# Patient Record
Sex: FEMALE | Race: BLACK OR AFRICAN AMERICAN | Employment: FULL TIME | URBAN - METROPOLITAN AREA
[De-identification: names, ages, dates, MRNs, and addresses within clinical notes are randomized per-mention and may not be internally consistent; named-entity substitution may affect disease eponyms.]

---

## 2017-01-17 ENCOUNTER — OFFICE VISIT (OUTPATIENT)
Dept: INTERNAL MEDICINE CLINIC | Age: 57
End: 2017-01-17

## 2017-01-17 VITALS
DIASTOLIC BLOOD PRESSURE: 84 MMHG | WEIGHT: 205.8 LBS | TEMPERATURE: 98.2 F | OXYGEN SATURATION: 96 % | SYSTOLIC BLOOD PRESSURE: 134 MMHG | HEART RATE: 56 BPM | BODY MASS INDEX: 37.87 KG/M2 | RESPIRATION RATE: 16 BRPM | HEIGHT: 62 IN

## 2017-01-17 DIAGNOSIS — R73.09 ELEVATED HEMOGLOBIN A1C: ICD-10-CM

## 2017-01-17 DIAGNOSIS — L29.9 ITCHING: ICD-10-CM

## 2017-01-17 DIAGNOSIS — L30.9 DERMATITIS: ICD-10-CM

## 2017-01-17 DIAGNOSIS — M17.0 OSTEOARTHRITIS OF BOTH KNEES, UNSPECIFIED OSTEOARTHRITIS TYPE: ICD-10-CM

## 2017-01-17 DIAGNOSIS — E78.00 HYPERCHOLESTEROLEMIA: ICD-10-CM

## 2017-01-17 DIAGNOSIS — E55.9 HYPOVITAMINOSIS D: ICD-10-CM

## 2017-01-17 DIAGNOSIS — N18.1 CKD (CHRONIC KIDNEY DISEASE), STAGE 1: ICD-10-CM

## 2017-01-17 DIAGNOSIS — I10 ESSENTIAL HYPERTENSION: Primary | ICD-10-CM

## 2017-01-17 RX ORDER — CETIRIZINE HCL 10 MG
10 TABLET ORAL DAILY
Qty: 90 TAB | Refills: 1 | Status: SHIPPED | OUTPATIENT
Start: 2017-01-17 | End: 2021-06-09

## 2017-01-17 RX ORDER — LOSARTAN POTASSIUM 100 MG/1
TABLET ORAL
Qty: 90 TAB | Refills: 1 | Status: SHIPPED | OUTPATIENT
Start: 2017-01-17 | End: 2017-07-23 | Stop reason: SDUPTHER

## 2017-01-17 RX ORDER — AMLODIPINE BESYLATE 5 MG/1
TABLET ORAL
Qty: 90 TAB | Refills: 1 | Status: SHIPPED | OUTPATIENT
Start: 2017-01-17 | End: 2017-07-23 | Stop reason: SDUPTHER

## 2017-01-17 RX ORDER — HYDROCORTISONE 1 %
CREAM (GRAM) TOPICAL 2 TIMES DAILY
Qty: 30 G | Refills: 1 | Status: SHIPPED | OUTPATIENT
Start: 2017-01-17 | End: 2017-01-24

## 2017-01-17 RX ORDER — SULINDAC 200 MG/1
200 TABLET ORAL
Qty: 50 TAB | Refills: 1 | Status: SHIPPED | OUTPATIENT
Start: 2017-01-17 | End: 2018-01-08 | Stop reason: SDUPTHER

## 2017-01-17 RX ORDER — ATENOLOL 100 MG/1
TABLET ORAL
Qty: 90 TAB | Refills: 1 | Status: SHIPPED | OUTPATIENT
Start: 2017-01-17 | End: 2017-05-23 | Stop reason: SDUPTHER

## 2017-01-17 RX ORDER — HYDROCHLOROTHIAZIDE 25 MG/1
TABLET ORAL
Qty: 90 TAB | Refills: 1 | Status: SHIPPED | OUTPATIENT
Start: 2017-01-17 | End: 2017-07-23 | Stop reason: SDUPTHER

## 2017-01-17 RX ORDER — SIMVASTATIN 20 MG/1
TABLET, FILM COATED ORAL
Qty: 90 TAB | Refills: 1 | Status: SHIPPED | OUTPATIENT
Start: 2017-01-17 | End: 2017-07-23 | Stop reason: SDUPTHER

## 2017-01-17 NOTE — PROGRESS NOTES
Rm 18    Chief Complaint   Patient presents with    Medication Refill     Patient would like her refill to go to [facility]    Health Maintenance Due   Topic Date Due    Hepatitis C Screening  1960    BREAST CANCER SCRN MAMMOGRAM  04/17/2014    PAP AKA CERVICAL CYTOLOGY  05/30/2016    INFLUENZA AGE 9 TO ADULT  08/01/2016     1. Have you been to the ER, urgent care clinic since your last visit? Hospitalized since your last visit? No    2. Have you seen or consulted any other health care providers outside of the [address] [place] [address] since your last visit? Include any pap smears or colon screening.  No    Learning Assessment 3/30/2015   PRIMARY LEARNER Patient   HIGHEST LEVEL OF EDUCATION - PRIMARY LEARNER  > 4 YEARS OF COLLEGE   BARRIERS PRIMARY LEARNER NONE   CO-LEARNER CAREGIVER No   PRIMARY LANGUAGE ENGLISH   LEARNER PREFERENCE PRIMARY READING   ANSWERED BY Patient   RELATIONSHIP SELF     PHQ 2 / 9, over the last two weeks 8/25/2015   Little interest or pleasure in doing things Not at all   Feeling down, depressed or hopeless Several days   Total Score PHQ 2 1     Living medical will information given at previous visit

## 2017-01-17 NOTE — MR AVS SNAPSHOT
Visit Information Date & Time Provider Department Dept. Phone Encounter #  
 1/17/2017  8:30 AM Rehana Valle, [address] [place] [address] and Internal Medicine [phone] [id number] Follow-up Instructions Return in about 4 months (around 5/17/2017) for Blood Pressure follow-up, lab review. Upcoming Health Maintenance Date Due Hepatitis C Screening 1960 BREAST CANCER SCRN MAMMOGRAM 4/17/2014 PAP AKA CERVICAL CYTOLOGY 5/30/2016 INFLUENZA AGE 9 TO ADULT 8/1/2016 COLONOSCOPY 8/24/2020 DTaP/Tdap/Td series (2 - Td) 10/4/2022 Allergies as of 1/17/2017  Review Complete On: 1/17/2017 By: Rehana Valle MD  
  
 Severity Noted Reaction Type Reactions Ace Inhibitors  10/04/2012   Intolerance Cough Benazepril--stopped when off med. Hydroxyzine  01/11/2016    Rash Notes rash with regular use about one month after starting for itching/rash. No problems with Benadryl noted. Zyrtec [Cetirizine]  01/11/2016    Itching  
 rash Current Immunizations  Reviewed on 3/30/2015 Name Date Influenza Vaccine (Quad) PF 2/3/2014 Influenza Vaccine Split 10/4/2012 TDAP Vaccine 10/4/2012 Not reviewed this visit You Were Diagnosed With   
  
 Codes Comments Essential hypertension    -  Primary ICD-10-CM: I10 
ICD-9-CM: 401.9 Osteoarthritis of both knees, unspecified osteoarthritis type     ICD-10-CM: M17.0 ICD-9-CM: 715.96 Itching     ICD-10-CM: L29.9 ICD-9-CM: 698.9 CKD (chronic kidney disease), stage 1     ICD-10-CM: N18.1 ICD-9-CM: 585.1 Hypercholesterolemia     ICD-10-CM: E78.00 ICD-9-CM: 272.0 Elevated hemoglobin A1c     ICD-10-CM: R73.09 
ICD-9-CM: 790.29 Dermatitis     ICD-10-CM: L30.9 ICD-9-CM: 692.9 Hypovitaminosis D     ICD-10-CM: E55.9 ICD-9-CM: 268.9 Vitals BP Pulse Temp Resp Height(growth percentile) Weight(growth percentile) 134/84 (BP 1 Location: Left arm, BP Patient Position: Sitting) (!) 56 98.2 °F (36.8 °C) (Oral) 16 5' 2\" (1.575 m) 205 lb 12.8 oz (93.4 kg) SpO2 BMI OB Status Smoking Status 96% 37.64 kg/m2 Ablation Never Smoker Vitals History BMI and BSA Data Body Mass Index Body Surface Area  
 37.64 kg/m 2 2.02 m 2 Preferred Pharmacy Pharmacy Name Phone [facility] [phone] Your Updated Medication List  
  
   
This list is accurate as of: 1/17/17 10:05 AM.  Always use your most recent med list. amLODIPine 5 mg tablet Commonly known as:  [place] TAKE ONE TABLET BY MOUTH ONCE DAILY **TAKE  WITH  OTHER  BLOOD  PRESSURE  MEDICINES  NIGHTLY**  
  
 atenolol 100 mg tablet Commonly known as:  TENORMIN  
TAKE ONE TABLET BY MOUTH ONCE DAILY  
  
 cetirizine 10 mg tablet Commonly known as:  ZYRTEC Take 1 Tab by mouth daily. hydroCHLOROthiazide 25 mg tablet Commonly known as:  HYDRODIURIL  
TAKE ONE TABLET BY MOUTH ONCE DAILY  
  
 hydrocortisone 1 % topical cream  
Commonly known as:  CORTAID Apply  to affected area two (2) times a day for 7 days. use thin layer on rash as directed. losartan 100 mg tablet Commonly known as:  COZAAR  
TAKE ONE TABLET BY MOUTH ONCE DAILY  
  
 simvastatin 20 mg tablet Commonly known as:  ZOCOR  
TAKE ONE TABLET BY MOUTH ONCE DAILY AT  NIGHT  
  
 sulindac 200 mg tablet Commonly known as:  CLINORIL Take 1 Tab by mouth two (2) times daily as needed for Pain. Take instead of BC powder or other NSAIDs (ibuprofen, naprosyn). Prescriptions Sent to Pharmacy Refills  
 sulindac (CLINORIL) 200 mg tablet 1 Sig: Take 1 Tab by mouth two (2) times daily as needed for Pain. Take instead of BC powder or other NSAIDs (ibuprofen, naprosyn).   
 Class: Normal  
 Pharmacy: [address], [address] [facility] Ph #: [phone] Route: Oral  
 cetirizine (ZYRTEC) 10 mg tablet 1 Sig: Take 1 Tab by mouth daily. Class: Normal  
 Pharmacy: [address], [address] Ph #: [phone] Route: Oral  
 atenolol (TENORMIN) 100 mg tablet 1 Sig: TAKE ONE TABLET BY MOUTH ONCE DAILY Class: Normal  
 Pharmacy: [address], [address] Ph #: [phone]  
 simvastatin (ZOCOR) 20 mg tablet 1 Sig: TAKE ONE TABLET BY MOUTH ONCE DAILY AT  NIGHT Class: Normal  
 Pharmacy: [address], [address] Ph #: [phone]  
 losartan (COZAAR) 100 mg tablet 1 Sig: TAKE ONE TABLET BY MOUTH ONCE DAILY Class: Normal  
 Pharmacy: [address], [address] Ph #: [phone]  
 hydroCHLOROthiazide (HYDRODIURIL) 25 mg tablet 1 Sig: TAKE ONE TABLET BY MOUTH ONCE DAILY Class: Normal  
 Pharmacy: [address], [address] Ph #: [phone]  
 amLODIPine (NORVASC) 5 mg tablet 1 Sig: TAKE ONE TABLET BY MOUTH ONCE DAILY **TAKE  WITH  OTHER  BLOOD  PRESSURE  MEDICINES  NIGHTLY**  
 Class: Normal  
 Pharmacy: [address], [place] - [address] Ph #: [phone]  
 hydrocortisone (CORTAID) 1 % topical cream 1 Sig: Apply  to affected area two (2) times a day for 7 days. use thin layer on rash as directed. Class: Normal  
 Pharmacy: [address], [address] Ph #: [phone] Route: Topical  
  
We Performed the Following CBC WITH AUTOMATED DIFF [37778 CPT(R)] CK [id number] CPT(R)] HEMOGLOBIN A1C WITH EAG [18232 CPT(R)] HEPATIC FUNCTION PANEL [19321 CPT(R)] LIPID PANEL [60606 CPT(R)] REFERRAL TO DERMATOLOGY [REF19 Custom] Comments:  
 Please evaluate patient for recurring rash. RENAL FUNCTION PANEL [28917 CPT(R)] VITAMIN D, 25 HYDROXY [id number] CPT(R)] Follow-up Instructions Return in about 4 months (around 5/17/2017) for Blood Pressure follow-up, lab review. Referral Information Referral ID Referred By Referred To  
  
 [id number] MD Jasmin ForbesLisa Batista [address] [facility] [address] [address] [address] Phone: [phone] Fax: [phone] Visits Status Start Date End Date 1 New Request 1/17/17 1/17/18 If your referral has a status of pending review or denied, additional information will be sent to support the outcome of this decision. Patient Instructions You can call either call [address] [place] [address] Dermatology or [facility] if the referred provider/group does not take your insurance. Referrals processing Please verify with your insurance IF you need referral authorization submitted. For insurance plans which require this, please follow the following steps. FAILURE TO DO SO MAY RESULT IN INABILITY TO SEE THE SPECIALIST YOU HAVE BEEN REFERRED TO.  
1. Call and schedule appointment with specialist 
2. Call our clinic and leave message with provider name, and date of appointment 3. We will then submit the referral to your insurance. This process takes 2-5 business days. Use over the counter, topical calamine or caladryl or topical benadryl for the itching/rash. Use colloidal oatmeal baths and/or moisturizers to help with itching. Use Benadryl/diphenhydramine as directed/needed for itching. Introducing [facility] & HEALTH SERVICES! Dear Jana Maguire: Thank you for requesting a Inbox account. Our records indicate that you already have an active Inbox account. You can access your account anytime at [email or web address] Did you know that you can access your hospital and ER discharge instructions at any time in [employer]Roadie? You can also review all of your test results from your hospital stay or ER visit. Additional Information If you have questions, please visit the Frequently Asked Questions section of the [employer] website at [email or web address] Remember, Sound[employer]ie is NOT to be used for urgent needs. For medical emergencies, dial 911. Now available from your iPhone and Android! Please provide this summary of care documentation to your next provider. Your primary care clinician is listed as [facility]. If you have any questions after today's visit, please call [phone].

## 2017-01-17 NOTE — PROGRESS NOTES
HISTORY OF PRESENT ILLNESS  Chante Castro is a 61 y.o. female. HPI  Here for follow-up, medication review--last seen Jan 2016. Notes not able to come because work too busy. She notes still getting rash at times. Not using Zyrtec at this time, but helped in past.  She does not have Zyrtec at this time. Had prescribed in past.    Reviewed meds, refills, prior labs. She is interested in seeing Derm for rash. Reviewed referral with pt. Reviewed otpical steroid PRN. Fasting for labs today. ROS      Blood pressure 134/84, pulse (!) 56, temperature 98.2 °F (36.8 °C), temperature source Oral, resp. rate 16, height 5' 2\" (1.575 m), weight 205 lb 12.8 oz (93.4 kg), SpO2 96 %. Vitals:    01/17/17 0854   BP: 134/84   Pulse: (!) 56   Resp: 16   Temp: 98.2 °F (36.8 °C)   TempSrc: Oral   SpO2: 96%   Weight: 205 lb 12.8 oz (93.4 kg)   Height: 5' 2\" (1.575 m)       Physical Exam   Constitutional: She appears well-developed and well-nourished. No distress. HENT:   Head: Normocephalic and atraumatic. Eyes: Conjunctivae are normal. Right eye exhibits no discharge. Left eye exhibits no discharge. No scleral icterus. Neck: Normal range of motion. Neck supple. No tracheal deviation present. No thyromegaly present. Cardiovascular: Normal rate, regular rhythm, normal heart sounds and intact distal pulses. Exam reveals no gallop and no friction rub. No murmur heard. Pulmonary/Chest: Effort normal and breath sounds normal. No stridor. No respiratory distress. She has no wheezes. She has no rales. She exhibits no tenderness. Abdominal: Soft. Bowel sounds are normal. She exhibits no distension. There is no tenderness. Musculoskeletal: She exhibits no edema or tenderness. Arms:  Lymphadenopathy:     She has no cervical adenopathy. Neurological: She is alert. She exhibits normal muscle tone. Coordination normal.   Skin: Skin is warm. Rash noted. She is not diaphoretic. No erythema. No pallor. Psychiatric: She has a normal mood and affect. Her behavior is normal. Judgment and thought content normal.       ASSESSMENT and PLAN    ICD-10-CM ICD-9-CM    1. Essential hypertension I10 401.9 atenolol (TENORMIN) 100 mg tablet      losartan (COZAAR) 100 mg tablet      hydroCHLOROthiazide (HYDRODIURIL) 25 mg tablet      amLODIPine (NORVASC) 5 mg tablet      CBC WITH AUTOMATED DIFF      RENAL FUNCTION PANEL   2. Osteoarthritis of both knees, unspecified osteoarthritis type M17.0 715.96 sulindac (CLINORIL) 200 mg tablet   3. Itching--resolving contact dermatitis--unclear source L29.9 698.9 cetirizine (ZYRTEC) 10 mg tablet      REFERRAL TO DERMATOLOGY      hydrocortisone (CORTAID) 1 % topical cream   4. CKD (chronic kidney disease), stage 1 N18.1 585.1 RENAL FUNCTION PANEL   5. Hypercholesterolemia E78.00 272.0 simvastatin (ZOCOR) 20 mg tablet      LIPID PANEL      CK      HEPATIC FUNCTION PANEL   6. Elevated hemoglobin A1c R73.09 790.29 HEMOGLOBIN A1C WITH EAG   7. Dermatitis L30.9 692.9 REFERRAL TO DERMATOLOGY      hydrocortisone (CORTAID) 1 % topical cream   8. Hypovitaminosis D E55.9 268.9 VITAMIN D, 25 HYDROXY       1. Good control--med refills reviewed. 2.  Refill reviewed. Uses PRN. 50 tabs with 1 refill expected to last pt for 6mo.    3,7:  Meds and referral reviewed. 4.  Improved last labs--repeat today as renal panel. 5.  Reviewed change statin--review at follow-up after repeat lipids. 6.  Improved from 6.2 to 6 last check. 8.  Normal prior at just >30. Follow-up Disposition:  Return in about 4 months (around 5/17/2017) for Blood Pressure follow-up, lab review. lab results and schedule of future lab studies reviewed with patient  reviewed medications and side effects in detail    For additional documentation of information and/or recommendations discussed this visit, please see notes in instructions.

## 2017-01-17 NOTE — PATIENT INSTRUCTIONS
You can call either call [address] [place] [address] Dermatology or [facility] if the referred provider/group does not take your insurance. Referrals processing  Please verify with your insurance IF you need referral authorization submitted. For insurance plans which require this, please follow the following steps. FAILURE TO DO SO MAY RESULT IN INABILITY TO SEE THE SPECIALIST YOU HAVE BEEN REFERRED TO.   1. Call and schedule appointment with specialist  2. Call our clinic and leave message with provider name, and date of appointment  3. We will then submit the referral to your insurance. This process takes 2-5 business days. Use over the counter, topical calamine or caladryl or topical benadryl for the itching/rash. Use colloidal oatmeal baths and/or moisturizers to help with itching. Use Benadryl/diphenhydramine as directed/needed for itching.

## 2017-01-18 LAB
25(OH)D3+25(OH)D2 SERPL-MCNC: 26.9 NG/ML (ref 30–100)
ALBUMIN SERPL-MCNC: 4.7 G/DL (ref 3.5–5.5)
ALP SERPL-CCNC: 87 IU/L (ref 39–117)
ALT SERPL-CCNC: 14 IU/L (ref 0–32)
AST SERPL-CCNC: 18 IU/L (ref 0–40)
BASOPHILS # BLD AUTO: 0 X10E3/UL (ref 0–0.2)
BASOPHILS NFR BLD AUTO: 0 %
BILIRUB DIRECT SERPL-MCNC: 0.12 MG/DL (ref 0–0.4)
BILIRUB SERPL-MCNC: 0.6 MG/DL (ref 0–1.2)
BUN SERPL-MCNC: 16 MG/DL (ref 6–24)
BUN/CREAT SERPL: 13 (ref 9–23)
CALCIUM SERPL-MCNC: 10.1 MG/DL (ref 8.7–10.2)
CHLORIDE SERPL-SCNC: 101 MMOL/L (ref 96–106)
CHOLEST SERPL-MCNC: 229 MG/DL (ref 100–199)
CK SERPL-CCNC: 164 U/L (ref 24–173)
CO2 SERPL-SCNC: 20 MMOL/L (ref 18–29)
CREAT SERPL-MCNC: 1.21 MG/DL (ref 0.57–1)
EOSINOPHIL # BLD AUTO: 0.2 X10E3/UL (ref 0–0.4)
EOSINOPHIL NFR BLD AUTO: 3 %
ERYTHROCYTE [DISTWIDTH] IN BLOOD BY AUTOMATED COUNT: 13 % (ref 12.3–15.4)
EST. AVERAGE GLUCOSE BLD GHB EST-MCNC: 108 MG/DL
GLUCOSE SERPL-MCNC: 91 MG/DL (ref 65–99)
HBA1C MFR BLD: 5.4 % (ref 4.8–5.6)
HCT VFR BLD AUTO: 41.2 % (ref 34–46.6)
HDLC SERPL-MCNC: 59 MG/DL
HGB BLD-MCNC: 14.1 G/DL (ref 11.1–15.9)
IMM GRANULOCYTES # BLD: 0 X10E3/UL (ref 0–0.1)
IMM GRANULOCYTES NFR BLD: 0 %
LDLC SERPL CALC-MCNC: 137 MG/DL (ref 0–99)
LYMPHOCYTES # BLD AUTO: 1.6 X10E3/UL (ref 0.7–3.1)
LYMPHOCYTES NFR BLD AUTO: 29 %
MCH RBC QN AUTO: 32.3 PG (ref 26.6–33)
MCHC RBC AUTO-ENTMCNC: 34.2 G/DL (ref 31.5–35.7)
MCV RBC AUTO: 95 FL (ref 79–97)
MONOCYTES # BLD AUTO: 0.3 X10E3/UL (ref 0.1–0.9)
MONOCYTES NFR BLD AUTO: 5 %
NEUTROPHILS # BLD AUTO: 3.5 X10E3/UL (ref 1.4–7)
NEUTROPHILS NFR BLD AUTO: 63 %
PHOSPHATE SERPL-MCNC: 3.5 MG/DL (ref 2.5–4.5)
PLATELET # BLD AUTO: 254 X10E3/UL (ref 150–379)
POTASSIUM SERPL-SCNC: 4.1 MMOL/L (ref 3.5–5.2)
PROT SERPL-MCNC: 8 G/DL (ref 6–8.5)
RBC # BLD AUTO: 4.36 X10E6/UL (ref 3.77–5.28)
SODIUM SERPL-SCNC: 144 MMOL/L (ref 134–144)
TRIGL SERPL-MCNC: 163 MG/DL (ref 0–149)
VLDLC SERPL CALC-MCNC: 33 MG/DL (ref 5–40)
WBC # BLD AUTO: 5.6 X10E3/UL (ref 3.4–10.8)

## 2017-01-23 ENCOUNTER — TELEPHONE (OUTPATIENT)
Dept: INTERNAL MEDICINE CLINIC | Age: 57
End: 2017-01-23

## 2017-01-23 NOTE — TELEPHONE ENCOUNTER
Patient wants to know why was amlodipine added back on her medication list for BP.  Please advise  # [id number]

## 2017-05-01 ENCOUNTER — TELEPHONE (OUTPATIENT)
Dept: INTERNAL MEDICINE CLINIC | Age: 57
End: 2017-05-01

## 2017-05-01 ENCOUNTER — HOSPITAL ENCOUNTER (OUTPATIENT)
Dept: GENERAL RADIOLOGY | Age: 57
Discharge: HOME | End: 2017-05-01
Payer: COMMERCIAL

## 2017-05-01 ENCOUNTER — OFFICE VISIT (OUTPATIENT)
Dept: INTERNAL MEDICINE CLINIC | Age: 57
End: 2017-05-01

## 2017-05-01 VITALS
TEMPERATURE: 97.3 F | OXYGEN SATURATION: 97 % | SYSTOLIC BLOOD PRESSURE: 124 MMHG | BODY MASS INDEX: 37.36 KG/M2 | WEIGHT: 203 LBS | HEART RATE: 55 BPM | DIASTOLIC BLOOD PRESSURE: 80 MMHG | RESPIRATION RATE: 17 BRPM | HEIGHT: 62 IN

## 2017-05-01 DIAGNOSIS — R76.11 POSITIVE TB TEST: ICD-10-CM

## 2017-05-01 DIAGNOSIS — Z02.1 PRE-EMPLOYMENT EXAMINATION: ICD-10-CM

## 2017-05-01 DIAGNOSIS — I10 ESSENTIAL HYPERTENSION: ICD-10-CM

## 2017-05-01 DIAGNOSIS — R76.11 POSITIVE TB TEST: Primary | ICD-10-CM

## 2017-05-01 PROCEDURE — 71020 XR CHEST PA LAT: CPT

## 2017-05-01 NOTE — PROGRESS NOTES
Rm 16    Chief Complaint   Patient presents with    Form Completion    Pre employment form Patient stated they did a TB test [employer] and the numbers came back High , copy provided by patient     Health Maintenance Due   Topic    Hepatitis C Screening     BREAST CANCER SCRN MAMMOGRAM     PAP AKA CERVICAL CYTOLOGY          Learning Assessment 3/30/2015   PRIMARY LEARNER Patient   HIGHEST LEVEL OF EDUCATION - PRIMARY LEARNER  > 4 YEARS OF COLLEGE   BARRIERS PRIMARY LEARNER NONE   CO-LEARNER CAREGIVER No   PRIMARY LANGUAGE ENGLISH   LEARNER PREFERENCE PRIMARY READING   ANSWERED BY Patient   RELATIONSHIP SELF     PHQ 2 / 9, over the last two weeks 8/25/2015   Little interest or pleasure in doing things Not at all   Feeling down, depressed or hopeless Several days   Total Score PHQ 2 1     Living medical will information given at previous visit

## 2017-05-01 NOTE — PROGRESS NOTES
HISTORY OF PRESENT ILLNESS  Jorge L Hill is a 61 y.o. female. HPI  Here for evaluation of form. Pt notes with pre-employment physical, had TB testing with [employer]--elevated/high values--has copy with her at visit. Here for eval and form completion/review. Notes no specific exposures to TB. TB screenin. Family member/contact dx with TB disease: no--notes last screened as child. 2. Family member/close contact with (+) PPD: No   3. Birth/residence (more than one wk) in high-risk country: No  4. Prolonged contact/lived with person with (prior) residence in high-risk country:  No    No prior positive testing reported by pt. She notes work as traveling [employer], but not with homeless shelters, hospitals, medical clinics, or immigrant/refugee populations identified as high risk. Travel only on cruise in [place]. No clear risk identified. Reviewed with pt at visit. She has not had screening here with PPD or blood testing. Reviewed CXR  report and CT  report--no active/old disease noted. Plan for eval reviewed. ROS      Blood pressure 124/80, pulse (!) 55, temperature 97.3 °F (36.3 °C), temperature source Oral, resp. rate 17, height 5' 2\" (1.575 m), weight 203 lb (92.1 kg), SpO2 97 %. Physical Exam   Constitutional: She appears well-developed and well-nourished. No distress. HENT:   Head: Normocephalic and atraumatic. Eyes: Conjunctivae are normal. Right eye exhibits no discharge. Left eye exhibits no discharge. No scleral icterus. Neck: Normal range of motion. Neck supple. Cardiovascular: Normal rate, regular rhythm, normal heart sounds and intact distal pulses. Exam reveals no gallop and no friction rub. No murmur heard. Pulmonary/Chest: Effort normal and breath sounds normal. No respiratory distress. She has no wheezes. She has no rales. She exhibits no tenderness. Abdominal: Soft. Bowel sounds are normal. She exhibits no distension. There is no tenderness. Musculoskeletal: She exhibits edema (1+ distal LE's bilat. ). She exhibits no tenderness. Neurological: She is alert. She exhibits normal muscle tone. Coordination normal.   Skin: Skin is warm. No rash noted. She is not diaphoretic. No erythema. No pallor. Psychiatric: She has a normal mood and affect. Her behavior is normal. Judgment and thought content normal.       ASSESSMENT and PLAN    ICD-10-CM ICD-9-CM    1. Positive TB test R76.11 795.51 XR CHEST PA LAT   2. Pre-employment examination Z02.1 V70.5 XR CHEST PA LAT   3. Essential hypertension I10 401.9        1,2:  Reviewed eval, form completion with pt at visit. Release completed to fax form. Prefers mailed to her once completed. Follow-up Disposition:  Return in about 3 months (around 8/1/2017) for Blood Pressure follow-up.  lab results and schedule of future lab studies reviewed with patient  reviewed medications and side effects in detail  radiology results and schedule of future radiology studies reviewed with patient    For additional documentation of information and/or recommendations discussed this visit, please see notes in instructions. Plan and evaluation (above) reviewed with pt at visit  Patient voiced understanding of plan and provided with time to ask/review questions. After Visit Summary (AVS) provided to pt after visit with additional instructions as needed/reviewed. Addendum 5-1-17:  CXR done and reported. Reviewed films. No active disease. Form completed as above to fax to [employer] and mail to pt. See telephone encounter with result to pt.

## 2017-05-01 NOTE — PATIENT INSTRUCTIONS
Please obtain x-ray today as reviewed. Once report received, will fax to [employer]), and mail copy to you as requested. If they do not receive, call us and will re-fax. You will receive a call regarding result of x-ray once reviewed and form faxed.

## 2017-05-01 NOTE — MR AVS SNAPSHOT
Visit Information Date & Time Provider Department Dept. Phone Encounter #  
 5/1/2017 12:00 PM Fabiana Elliott, [address] [place] [address] and Internal Medicine [phone] [id number] Follow-up Instructions Return in about 3 months (around 8/1/2017) for Blood Pressure follow-up. Upcoming Health Maintenance Date Due Hepatitis C Screening 1960 BREAST CANCER SCRN MAMMOGRAM 4/17/2014 PAP AKA CERVICAL CYTOLOGY 5/30/2016 INFLUENZA AGE 9 TO ADULT 8/1/2017 COLONOSCOPY 8/24/2020 DTaP/Tdap/Td series (2 - Td) 10/4/2022 Allergies as of 5/1/2017  Review Complete On: 5/1/2017 By: Fabiana Elliott MD  
  
 Severity Noted Reaction Type Reactions Ace Inhibitors  10/04/2012   Intolerance Cough Benazepril--stopped when off med. Hydroxyzine  01/11/2016    Rash Notes rash with regular use about one month after starting for itching/rash. No problems with Benadryl noted. Zyrtec [Cetirizine]  01/11/2016    Itching  
 rash Current Immunizations  Reviewed on 3/30/2015 Name Date Influenza Vaccine (Quad) PF 2/3/2014 Influenza Vaccine Split 10/4/2012 TDAP Vaccine 10/4/2012 Not reviewed this visit You Were Diagnosed With   
  
 Codes Comments Positive TB test    -  Primary ICD-10-CM: R76.11 
ICD-9-CM: 795.51 Pre-employment examination     ICD-10-CM: Z02.1 ICD-9-CM: V70.5 Essential hypertension     ICD-10-CM: I10 
ICD-9-CM: 401.9 Vitals BP Pulse Temp Resp Height(growth percentile) Weight(growth percentile) 124/80 (BP 1 Location: Left arm, BP Patient Position: Sitting) (!) 55 97.3 °F (36.3 °C) (Oral) 17 5' 2\" (1.575 m) 203 lb (92.1 kg) SpO2 BMI OB Status Smoking Status 97% 37.13 kg/m2 Ablation Never Smoker Vitals History BMI and BSA Data Body Mass Index Body Surface Area  
 37.13 kg/m 2 2.01 m 2 [phone] Your Updated Medication List  
  
   
This list is accurate as of: 5/1/17  1:21 PM.  Always use your most recent med list. amLODIPine 5 mg tablet Commonly known as:  Beryl Christianson TAKE ONE TABLET BY MOUTH ONCE DAILY **TAKE  WITH  OTHER  BLOOD  PRESSURE  MEDICINES  NIGHTLY**  
  
 atenolol 100 mg tablet Commonly known as:  TENORMIN  
TAKE ONE TABLET BY MOUTH ONCE DAILY  
  
 cetirizine 10 mg tablet Commonly known as:  ZYRTEC Take 1 Tab by mouth daily. hydroCHLOROthiazide 25 mg tablet Commonly known as:  HYDRODIURIL  
TAKE ONE TABLET BY MOUTH ONCE DAILY losartan 100 mg tablet Commonly known as:  COZAAR  
TAKE ONE TABLET BY MOUTH ONCE DAILY  
  
 simvastatin 20 mg tablet Commonly known as:  ZOCOR  
TAKE ONE TABLET BY MOUTH ONCE DAILY AT  NIGHT  
  
 sulindac 200 mg tablet Commonly known as:  CLINORIL Take 1 Tab by mouth two (2) times daily as needed for Pain. Take instead of BC powder or other NSAIDs (ibuprofen, naprosyn). Follow-up Instructions Return in about 3 months (around 8/1/2017) for Blood Pressure follow-up. To-Do List   
 05/01/2017 Imaging:  XR CHEST PA LAT Patient Instructions Please obtain x-ray today as reviewed. Once report received, will fax to [employer], and mail copy to you as requested. If they do not receive, call us and will re-fax. You will receive a call regarding result of x-ray once reviewed and form faxed. Introducing \[facility]\"" & HEALTH SERVICES! Dear Ama Colon: Thank you for requesting a [employer] account. Our records indicate that you already have an active [employer] account. You can access your account anytime at [email or web address] Did you know that you can access your hospital and ER discharge instructions at any time in [employer]?   You can also review all of your test results from your hospital stay or ER visit. Additional Information If you have questions, please visit the Frequently Asked Questions section of the [employer] website at [email or web address] Remember, [employer] is NOT to be used for urgent needs. For medical emergencies, dial 911. Now available from your iPhone and Android! Please provide this summary of care documentation to your next provider. Your primary care clinician is listed as [facility]. If you have any questions after today's visit, please call [phone].

## 2017-05-01 NOTE — TELEPHONE ENCOUNTER
Tried to contact patient no answer machine if patient does call please give information from Dr Tamica Woody in previous message, will send also through [employer], copy is in media , copy faxed to [employer] office & patients copy went out in mail

## 2017-05-01 NOTE — TELEPHONE ENCOUNTER
Please let pt know CXR showed no active disease. This is LTBI or latent infection as reviewed at visit. Form completed to fax to [employer] as reviewed, and mail to pt.

## 2017-05-23 DIAGNOSIS — I10 ESSENTIAL HYPERTENSION: ICD-10-CM

## 2017-05-23 RX ORDER — ATENOLOL 50 MG/1
TABLET ORAL
Qty: 60 TAB | Refills: 5 | Status: SHIPPED | OUTPATIENT
Start: 2017-05-23 | End: 2018-01-08 | Stop reason: SDUPTHER

## 2017-05-23 NOTE — TELEPHONE ENCOUNTER
Received request that patients Atenolol Tabs 100mg is out of stock ,  They have Atenolol 50 mg,  Can they fill the script with this is so please send new script over to pharmacy

## 2017-05-23 NOTE — TELEPHONE ENCOUNTER
Refill request(s) approved--atenolol. Atenolol 50mg requested to replace 100mg tab which is out of stock. Requested Prescriptions     Signed Prescriptions Disp Refills    atenolol (TENORMIN) 50 mg tablet 60 Tab 5     Sig: TAKE TWO TABLET BY MOUTH ONCE DAILY. Take instead of single 100mg tablet (out of stock). Authorizing Provider: Sonny Gardner       Please review change with pt if not already done.

## 2017-07-23 DIAGNOSIS — I10 ESSENTIAL HYPERTENSION: ICD-10-CM

## 2017-07-23 DIAGNOSIS — E78.00 HYPERCHOLESTEROLEMIA: ICD-10-CM

## 2017-07-24 RX ORDER — LOSARTAN POTASSIUM 100 MG/1
TABLET ORAL
Qty: 90 TAB | Refills: 1 | Status: SHIPPED | OUTPATIENT
Start: 2017-07-24 | End: 2018-01-08 | Stop reason: SDUPTHER

## 2017-07-24 RX ORDER — HYDROCHLOROTHIAZIDE 25 MG/1
TABLET ORAL
Qty: 90 TAB | Refills: 1 | Status: SHIPPED | OUTPATIENT
Start: 2017-07-24 | End: 2018-01-08 | Stop reason: SDUPTHER

## 2017-07-24 RX ORDER — AMLODIPINE BESYLATE 5 MG/1
TABLET ORAL
Qty: 90 TAB | Refills: 1 | Status: SHIPPED | OUTPATIENT
Start: 2017-07-24 | End: 2018-01-08

## 2017-07-24 RX ORDER — SIMVASTATIN 20 MG/1
TABLET, FILM COATED ORAL
Qty: 90 TAB | Refills: 1 | Status: SHIPPED | OUTPATIENT
Start: 2017-07-24 | End: 2018-01-08 | Stop reason: SDUPTHER

## 2017-10-15 ENCOUNTER — APPOINTMENT (OUTPATIENT)
Dept: GENERAL RADIOLOGY | Age: 57
End: 2017-10-15
Attending: PHYSICIAN ASSISTANT
Payer: COMMERCIAL

## 2017-10-15 ENCOUNTER — HOSPITAL ENCOUNTER (EMERGENCY)
Age: 57
Discharge: HOME | End: 2017-10-15
Attending: EMERGENCY MEDICINE
Payer: COMMERCIAL

## 2017-10-15 VITALS
OXYGEN SATURATION: 99 % | TEMPERATURE: 98.7 F | HEART RATE: 86 BPM | SYSTOLIC BLOOD PRESSURE: 137 MMHG | HEIGHT: 62 IN | RESPIRATION RATE: 16 BRPM | BODY MASS INDEX: 35.88 KG/M2 | WEIGHT: 195 LBS | DIASTOLIC BLOOD PRESSURE: 88 MMHG

## 2017-10-15 DIAGNOSIS — M10.071 ACUTE IDIOPATHIC GOUT OF RIGHT ANKLE: ICD-10-CM

## 2017-10-15 DIAGNOSIS — M25.571 ACUTE RIGHT ANKLE PAIN: Primary | ICD-10-CM

## 2017-10-15 PROCEDURE — 74011250637 HC RX REV CODE- 250/637: Performed by: PHYSICIAN ASSISTANT

## 2017-10-15 PROCEDURE — 73610 X-RAY EXAM OF ANKLE: CPT

## 2017-10-15 PROCEDURE — 74011250636 HC RX REV CODE- 250/636: Performed by: PHYSICIAN ASSISTANT

## 2017-10-15 PROCEDURE — 96372 THER/PROPH/DIAG INJ SC/IM: CPT

## 2017-10-15 PROCEDURE — 99283 EMERGENCY DEPT VISIT LOW MDM: CPT

## 2017-10-15 RX ORDER — PREDNISONE 5 MG/1
TABLET ORAL
Qty: 21 TAB | Refills: 0 | Status: SHIPPED | OUTPATIENT
Start: 2017-10-15 | End: 2017-10-15

## 2017-10-15 RX ORDER — OXYCODONE AND ACETAMINOPHEN 5; 325 MG/1; MG/1
1 TABLET ORAL
Qty: 12 TAB | Refills: 0 | Status: SHIPPED | OUTPATIENT
Start: 2017-10-15 | End: 2018-01-08

## 2017-10-15 RX ORDER — PREDNISONE 5 MG/1
TABLET ORAL
Qty: 21 TAB | Refills: 0 | Status: SHIPPED | OUTPATIENT
Start: 2017-10-15 | End: 2018-01-08

## 2017-10-15 RX ORDER — OXYCODONE AND ACETAMINOPHEN 5; 325 MG/1; MG/1
1 TABLET ORAL
Status: COMPLETED | OUTPATIENT
Start: 2017-10-15 | End: 2017-10-15

## 2017-10-15 RX ADMIN — OXYCODONE HYDROCHLORIDE AND ACETAMINOPHEN 1 TABLET: 5; 325 TABLET ORAL at 12:28

## 2017-10-15 RX ADMIN — METHYLPREDNISOLONE SODIUM SUCCINATE 125 MG: 125 INJECTION, POWDER, FOR SOLUTION INTRAMUSCULAR; INTRAVENOUS at 12:50

## 2017-10-15 NOTE — DISCHARGE INSTRUCTIONS
Foot Pain: Care Instructions  Your Care Instructions  Foot injuries that cause pain and swelling are fairly common. Almost all sports or home repair projects can cause a misstep that ends up as foot pain. Normal wear and tear, especially as you get older, also can cause foot pain. Most minor foot injuries will heal on their own, and home treatment is usually all you need to do. If you have a severe injury, you may need tests and treatment. Follow-up care is a key part of your treatment and safety. Be sure to make and go to all appointments, and call your doctor if you are having problems. Its also a good idea to know your test results and keep a list of the medicines you take. How can you care for yourself at home? · Take pain medicines exactly as directed. ¨ If the doctor gave you a prescription medicine for pain, take it as prescribed. ¨ If you are not taking a prescription pain medicine, ask your doctor if you can take an over-the-counter medicine. · Rest and protect your foot. Take a break from any activity that may cause pain. · Put ice or a cold pack on your foot for 10 to 20 minutes at a time. Put a thin cloth between the ice and your skin. · Prop up the sore foot on a pillow when you ice it or anytime you sit or lie down during the next 3 days. Try to keep it above the level of your heart. This will help reduce swelling. · Your doctor may recommend that you wrap your foot with an elastic bandage. Keep your foot wrapped for as long as your doctor advises. · If your doctor recommends crutches, use them as directed. · Wear roomy footwear. · As soon as pain and swelling end, begin gentle exercises of your foot. Your doctor can tell you which exercises will help. When should you call for help? Call 911 anytime you think you may need emergency care. For example, call if:  · Your foot turns pale, white, blue, or cold.   Call your doctor now or seek immediate medical care if:  · You cannot move or stand on your foot. · Your foot looks twisted or out of its normal position. · Your foot is not stable when you step down. · You have signs of infection, such as:  ¨ Increased pain, swelling, warmth, or redness. ¨ Red streaks leading from the sore area. ¨ Pus draining from a place on your foot. ¨ A fever. · Your foot is numb or tingly. Watch closely for changes in your health, and be sure to contact your doctor if:  · You do not get better as expected. · You have bruises from an injury that last longer than 2 weeks. Where can you learn more? Go to http://vicenta-betsy.info/. Enter F251 in the search box to learn more about \"Foot Pain: Care Instructions. \"  Current as of: March 21, 2017  Content Version: 11.3  © 2228-7480 [employer]. Care instructions adapted under license by [employer] (which disclaims liability or warranty for this information). If you have questions about a medical condition or this instruction, always ask your healthcare professional. [place] [address] any warranty or liability for your use of this information. Gout: Care Instructions  Your Care Instructions  Gout is a form of arthritis caused by a buildup of uric acid crystals in a joint. It causes sudden attacks of pain, swelling, redness, and stiffness, usually in one joint, especially the big toe. Gout usually comes on without a cause. But it can be brought on by drinking alcohol (especially beer) or eating seafood and red meat. Taking certain medicines, such as diuretics or aspirin, also can bring on an attack of gout. Taking your medicines as prescribed and following up with your doctor regularly can help you avoid gout attacks in the future. Follow-up care is a key part of your treatment and safety. Be sure to make and go to all appointments, and call your doctor if you are having problems.  Its also a good idea to know your test results and keep a list of the medicines you take. How can you care for yourself at home? · If the joint is swollen, put ice or a cold pack on the area for 10 to 20 minutes at a time. Put a thin cloth between the ice and your skin. · Prop up the sore limb on a pillow when you ice it or anytime you sit or lie down during the next 3 days. Try to keep it above the level of your heart. This will help reduce swelling. · Rest sore joints. Avoid activities that put weight or strain on the joints for a few days. Take short rest breaks from your regular activities during the day. · Take your medicines exactly as prescribed. Call your doctor if you think you are having a problem with your medicine. · Take pain medicines exactly as directed. ¨ If the doctor gave you a prescription medicine for pain, take it as prescribed. ¨ If you are not taking a prescription pain medicine, ask your doctor if you can take an over-the-counter medicine. · Eat less seafood and red meat. · Check with your doctor before drinking alcohol. · Losing weight, if you are overweight, may help reduce attacks of gout. But do not go on a [employer]. \" Losing a lot of weight in a short amount of time can cause a gout attack. When should you call for help? Call your doctor now or seek immediate medical care if:  · You have a fever. · The joint is so painful you cannot use it. · You have sudden, unexplained swelling, redness, warmth, or severe pain in one or more joints. Watch closely for changes in your health, and be sure to contact your doctor if:  · You have joint pain. · Your symptoms get worse or are not improving after 2 or 3 days. Where can you learn more? Go to http://vicenta-betsy.info/. Enter G135 in the search box to learn more about \"Gout: Care Instructions. \"  Current as of: October 31, 2016  Content Version: 11.3  © 4293-9194 [employer].  Care instructions adapted under license by [employer] (which disclaims liability or warranty for this information). If you have questions about a medical condition or this instruction, always ask your healthcare professional. [place] [address] any warranty or liability for your use of this information.

## 2017-10-15 NOTE — LETTER
[address] 
[facility] EMERGENCY DEPT 
[address] [place] [address] [id number] 
[phone] Work/School Note Date: 10/15/2017 To Whom It May concern: 
 
Tierney Morales was seen and treated today in the emergency room by the following provider(s): 
Attending Provider: Alexus Melgar MD 
Physician Assistant: GABINO Garcia. Tierney Morales No work 48 hours. Sincerely, GABINO Garcia
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
## 2017-10-15 NOTE — ED NOTES
Pt discharged by GABINO Kim. Patient provided with discharge instructions, Rx, and follow-up care instructions. Pt out of ED ambulatory.

## 2017-10-15 NOTE — ED TRIAGE NOTES
Pt. Presents to ED today for complaints of R ankle pain. Pt. Reports falling off of a ladder 3 years ago and states that it \"flairs up often. \"  Pt. Alert and oriented. Pt. Placed in position of comfort with call bell in reach.

## 2018-01-05 DIAGNOSIS — I10 ESSENTIAL HYPERTENSION: ICD-10-CM

## 2018-01-05 DIAGNOSIS — E78.00 HYPERCHOLESTEROLEMIA: ICD-10-CM

## 2018-01-05 RX ORDER — HYDROCHLOROTHIAZIDE 25 MG/1
TABLET ORAL
Qty: 90 TAB | Refills: 1 | Status: CANCELLED | OUTPATIENT
Start: 2018-01-05

## 2018-01-05 RX ORDER — SIMVASTATIN 20 MG/1
TABLET, FILM COATED ORAL
Qty: 90 TAB | Refills: 1 | Status: CANCELLED | OUTPATIENT
Start: 2018-01-05

## 2018-01-05 RX ORDER — LOSARTAN POTASSIUM 100 MG/1
TABLET ORAL
Qty: 90 TAB | Refills: 1 | Status: CANCELLED | OUTPATIENT
Start: 2018-01-05

## 2018-01-05 RX ORDER — ATENOLOL 50 MG/1
TABLET ORAL
Qty: 60 TAB | Refills: 5 | Status: CANCELLED | OUTPATIENT
Start: 2018-01-05

## 2018-01-05 NOTE — TELEPHONE ENCOUNTER
LOV: May 01, 2017  Patient want prescriptions sent to [facility] located on [address] [place] [address]. Their pharmacy number is # [phone]. Unable to attach pharmacy on file. Pharmacy is not listed.  ( brand new)

## 2018-01-08 ENCOUNTER — OFFICE VISIT (OUTPATIENT)
Dept: INTERNAL MEDICINE CLINIC | Age: 58
End: 2018-01-08

## 2018-01-08 VITALS
BODY MASS INDEX: 36.44 KG/M2 | HEIGHT: 62 IN | WEIGHT: 198 LBS | SYSTOLIC BLOOD PRESSURE: 150 MMHG | TEMPERATURE: 98.3 F | DIASTOLIC BLOOD PRESSURE: 97 MMHG | OXYGEN SATURATION: 97 % | HEART RATE: 70 BPM | RESPIRATION RATE: 16 BRPM

## 2018-01-08 DIAGNOSIS — M17.0 OSTEOARTHRITIS OF BOTH KNEES, UNSPECIFIED OSTEOARTHRITIS TYPE: ICD-10-CM

## 2018-01-08 DIAGNOSIS — E78.00 HYPERCHOLESTEROLEMIA: ICD-10-CM

## 2018-01-08 DIAGNOSIS — R73.09 ELEVATED HEMOGLOBIN A1C: ICD-10-CM

## 2018-01-08 DIAGNOSIS — B35.1 ONYCHOMYCOSIS: Primary | ICD-10-CM

## 2018-01-08 DIAGNOSIS — I10 ESSENTIAL HYPERTENSION: ICD-10-CM

## 2018-01-08 DIAGNOSIS — E55.9 HYPOVITAMINOSIS D: ICD-10-CM

## 2018-01-08 RX ORDER — HYDROCHLOROTHIAZIDE 25 MG/1
TABLET ORAL
Qty: 90 TAB | Refills: 1 | Status: SHIPPED | OUTPATIENT
Start: 2018-01-08 | End: 2018-01-20 | Stop reason: SDUPTHER

## 2018-01-08 RX ORDER — ATENOLOL 100 MG/1
100 TABLET ORAL DAILY
Qty: 90 TAB | Refills: 1 | Status: SHIPPED | OUTPATIENT
Start: 2018-01-08 | End: 2018-01-22 | Stop reason: SDUPTHER

## 2018-01-08 RX ORDER — TERBINAFINE HYDROCHLORIDE 250 MG/1
250 TABLET ORAL DAILY
Qty: 90 TAB | Refills: 0 | Status: SHIPPED | OUTPATIENT
Start: 2018-01-08 | End: 2018-10-16

## 2018-01-08 RX ORDER — LOSARTAN POTASSIUM 100 MG/1
TABLET ORAL
Qty: 90 TAB | Refills: 1 | Status: SHIPPED | OUTPATIENT
Start: 2018-01-08 | End: 2018-01-20 | Stop reason: SDUPTHER

## 2018-01-08 RX ORDER — SULINDAC 200 MG/1
200 TABLET ORAL
Qty: 50 TAB | Refills: 1 | Status: SHIPPED | OUTPATIENT
Start: 2018-01-08 | End: 2018-06-21 | Stop reason: SDUPTHER

## 2018-01-08 RX ORDER — SIMVASTATIN 20 MG/1
TABLET, FILM COATED ORAL
Qty: 90 TAB | Refills: 1 | Status: SHIPPED | OUTPATIENT
Start: 2018-01-08 | End: 2018-01-20 | Stop reason: SDUPTHER

## 2018-01-08 NOTE — PROGRESS NOTES
Rm 17    Chief Complaint   Patient presents with    Medication Evaluation    Blood Pressure Check       Patient is here today for medication evaluation and a b/p check    Per patient her B/p has been high due to out of medication. States she needs refills on medications    Health Maintenance Due   Topic Date Due    Hepatitis C Screening  1960    BREAST CANCER SCRN MAMMOGRAM  04/17/2014    PAP AKA CERVICAL CYTOLOGY  05/30/2016       Patient is due for Hep C  Screening , Pap exam , mammogram    1. Have you been to the ER, urgent care clinic since your last visit? Hospitalized since your last visit? Yes/ acute right ankle pain/ 10/2017- notes are in chart  2. Have you seen or consulted any other health care providers outside of the [address] [place] [address] since your last visit? Include any pap smears or colon screening.  no    Learning Assessment 3/30/2015   PRIMARY LEARNER Patient   HIGHEST LEVEL OF EDUCATION - PRIMARY LEARNER  > 4 YEARS OF COLLEGE   BARRIERS PRIMARY LEARNER NONE   CO-LEARNER CAREGIVER No   PRIMARY LANGUAGE ENGLISH   LEARNER PREFERENCE PRIMARY READING   ANSWERED BY Patient   RELATIONSHIP SELF     PHQ over the last two weeks 1/8/2018   Little interest or pleasure in doing things Not at all   Feeling down, depressed or hopeless Several days   Total Score PHQ 2 1     Living medical will information given at previous visit

## 2018-01-08 NOTE — MR AVS SNAPSHOT
Visit Information Date & Time Provider Department Dept. Phone Encounter #  
 1/8/2018  1:45 PM Ibeth Hall, [address] [place] [address], [facility] and Internal [facility] [phone] Follow-up Instructions Return in about 4 weeks (around 2/5/2018) for Blood Pressure follow-up, non-fasting labs. Upcoming Health Maintenance Date Due Hepatitis C Screening 1960 BREAST CANCER SCRN MAMMOGRAM 4/17/2014 PAP AKA CERVICAL CYTOLOGY 5/30/2016 COLONOSCOPY 8/24/2020 DTaP/Tdap/Td series (2 - Td) 10/4/2022 Allergies as of 1/8/2018  Review Complete On: 1/8/2018 By: Ibeth Hall MD  
  
 Severity Noted Reaction Type Reactions Ace Inhibitors  10/04/2012   Intolerance Cough Benazepril--stopped when off med. Hydroxyzine  01/11/2016    Rash Notes rash with regular use about one month after starting for itching/rash. No problems with Benadryl noted. Zyrtec [Cetirizine]  01/11/2016    Itching  
 rash Current Immunizations  Reviewed on 3/30/2015 Name Date Influenza Vaccine 9/25/2017 Influenza Vaccine (Quad) PF 2/3/2014 Influenza Vaccine Split 10/4/2012 TDAP Vaccine 10/4/2012 Not reviewed this visit You Were Diagnosed With   
  
 Codes Comments Onychomycosis    -  Primary ICD-10-CM: B35.1 ICD-9-CM: 110.1 Essential hypertension     ICD-10-CM: I10 
ICD-9-CM: 401.9 Hypercholesterolemia     ICD-10-CM: E78.00 ICD-9-CM: 272.0 Elevated hemoglobin A1c     ICD-10-CM: R73.09 
ICD-9-CM: 790.29 Hypovitaminosis D     ICD-10-CM: E55.9 ICD-9-CM: 268.9 Osteoarthritis of both knees, unspecified osteoarthritis type     ICD-10-CM: M17.0 ICD-9-CM: 715.96 Vitals BP Pulse Temp Resp Height(growth percentile) Weight(growth percentile) (!) 150/97 (BP 1 Location: Right arm, BP Patient Position: Sitting) 70 98.3 °F (36.8 °C) (Oral) 16 5' 2\" (1.575 m) 198 lb (89.8 kg) SpO2 BMI OB Status Smoking Status 97% 36.21 kg/m2 Ablation Never Smoker Vitals History BMI and BSA Data Body Mass Index Body Surface Area  
 36.21 kg/m 2 1.98 m 2 [phone] Your Updated Medication List  
  
   
This list is accurate as of: 1/8/18  2:44 PM.  Always use your most recent med list.  
  
  
  
  
 atenolol 100 mg tablet Commonly known as:  TENORMIN Take 1 Tab by mouth daily. cetirizine 10 mg tablet Commonly known as:  ZYRTEC Take 1 Tab by mouth daily. hydroCHLOROthiazide 25 mg tablet Commonly known as:  HYDRODIURIL  
TAKE 1 TABLET DAILY losartan 100 mg tablet Commonly known as:  COZAAR  
TAKE 1 TABLET DAILY  
  
 simvastatin 20 mg tablet Commonly known as:  ZOCOR  
TAKE 1 TABLET DAILY AT NIGHT  
  
 sulindac 200 mg tablet Commonly known as:  CLINORIL Take 1 Tab by mouth two (2) times daily as needed for Pain. Take instead of BC powder or other NSAIDs (ibuprofen, naprosyn). terbinafine HCl 250 mg tablet Commonly known as:  LAMISIL Take 1 Tab by mouth daily. Prescriptions Sent to Pharmacy Refills  
 atenolol (TENORMIN) 100 mg tablet 1 Sig: Take 1 Tab by mouth daily. Class: Normal  
 Pharmacy: [facility] DR LISA EWING [address] [place] [address], [address] [place] [address] Ph #: [phone] Route: Oral  
 hydroCHLOROthiazide (HYDRODIURIL) 25 mg tablet 1 Sig: TAKE 1 TABLET DAILY Class: Normal  
 Pharmacy: [facility] Ph #: [phone]  
 losartan (COZAAR) 100 mg tablet 1 Sig: TAKE 1 TABLET DAILY Class: Normal  
 Pharmacy: [facility] Ph #: [phone]  
 simvastatin (ZOCOR) 20 mg tablet 1 Sig: TAKE 1 TABLET DAILY AT NIGHT Class: Normal  
 Pharmacy: [facility] DR LISA EWING [address] [place] [address], [address] [place] [address] Ph #: [phone] terbinafine HCl (LAMISIL) 250 mg tablet 0 Sig: Take 1 Tab by mouth daily. Class: Normal  
 Pharmacy: [facility] DR LISA BARON GILDARDO [address] [place] [address], [address] [place] [address] Ph #: [phone] Route: Oral  
 sulindac (CLINORIL) 200 mg tablet 1 Sig: Take 1 Tab by mouth two (2) times daily as needed for Pain. Take instead of BC powder or other NSAIDs (ibuprofen, naprosyn). Class: Normal  
 Pharmacy: [facility] DR LISA BARON GILDARDO [address] [place] [address], [address] [place] [address] Ph #: [phone] Route: Oral  
  
We Performed the Following CBC WITH AUTOMATED DIFF [77406 CPT(R)] CK [id number] CPT(R)] HEMOGLOBIN A1C WITH EAG [22584 CPT(R)] HEPATIC FUNCTION PANEL [72095 CPT(R)] LIPID PANEL [82763 CPT(R)] RENAL FUNCTION PANEL [90257 CPT(R)] VITAMIN D, 25 HYDROXY [id number] CPT(R)] Follow-up Instructions Return in about 4 weeks (around 2/5/2018) for Blood Pressure follow-up, non-fasting labs. Patient Instructions With adding the terbinafine for the toenail fungal infection to your meds, will re-check your liver testing in 4 weeks. Can do sooner with your BP check, if you are having any symptoms as reviewed (nausea, vomiting, right sided abdominal pain). If doing well with the medication, please return in 4wks as reviewed. Introducing \[facility] [place] [facility]\"" & HEALTH SERVICES! Dear Emma Marques: Thank you for requesting a [email or web address] account. Our records indicate that you already have an active [email or web address] account. You can access your account anytime at [email or web address] Did you know that you can access your hospital and ER discharge instructions at any time in [email or web address]? You can also review all of your test results from your hospital stay or ER visit. Additional Information If you have questions, please visit the Frequently Asked Questions section of the [email or web address] website at [email or web address] Remember, [email or web address] is NOT to be used for urgent needs.  For medical emergencies, dial 911. Now available from your iPhone and Android! Please provide this summary of care documentation to your next provider. Your primary care clinician is listed as [facility]. If you have any questions after today's visit, please call [phone].

## 2018-01-08 NOTE — PATIENT INSTRUCTIONS
With adding the terbinafine for the toenail fungal infection to your meds, will re-check your liver testing in 4 weeks. Can do sooner with your BP check, if you are having any symptoms as reviewed (nausea, vomiting, right sided abdominal pain). If doing well with the medication, please return in 4wks as reviewed.

## 2018-01-09 LAB
25(OH)D3+25(OH)D2 SERPL-MCNC: 16.9 NG/ML (ref 30–100)
ALBUMIN SERPL-MCNC: 4.6 G/DL (ref 3.5–5.5)
ALP SERPL-CCNC: 86 IU/L (ref 39–117)
ALT SERPL-CCNC: 11 IU/L (ref 0–32)
AST SERPL-CCNC: 20 IU/L (ref 0–40)
BASOPHILS # BLD AUTO: 0 X10E3/UL (ref 0–0.2)
BASOPHILS NFR BLD AUTO: 0 %
BILIRUB DIRECT SERPL-MCNC: 0.12 MG/DL (ref 0–0.4)
BILIRUB SERPL-MCNC: 0.5 MG/DL (ref 0–1.2)
BUN SERPL-MCNC: 16 MG/DL (ref 6–24)
BUN/CREAT SERPL: 14 (ref 9–23)
CALCIUM SERPL-MCNC: 9.6 MG/DL (ref 8.7–10.2)
CHLORIDE SERPL-SCNC: 98 MMOL/L (ref 96–106)
CHOLEST SERPL-MCNC: 278 MG/DL (ref 100–199)
CK SERPL-CCNC: 148 U/L (ref 24–173)
CO2 SERPL-SCNC: 27 MMOL/L (ref 18–29)
CREAT SERPL-MCNC: 1.11 MG/DL (ref 0.57–1)
EOSINOPHIL # BLD AUTO: 0.2 X10E3/UL (ref 0–0.4)
EOSINOPHIL NFR BLD AUTO: 2 %
ERYTHROCYTE [DISTWIDTH] IN BLOOD BY AUTOMATED COUNT: 12.8 % (ref 12.3–15.4)
EST. AVERAGE GLUCOSE BLD GHB EST-MCNC: 117 MG/DL
GLUCOSE SERPL-MCNC: 84 MG/DL (ref 65–99)
HBA1C MFR BLD: 5.7 % (ref 4.8–5.6)
HCT VFR BLD AUTO: 37.9 % (ref 34–46.6)
HDLC SERPL-MCNC: 83 MG/DL
HGB BLD-MCNC: 12.9 G/DL (ref 11.1–15.9)
IMM GRANULOCYTES # BLD: 0 X10E3/UL (ref 0–0.1)
IMM GRANULOCYTES NFR BLD: 0 %
LDLC SERPL CALC-MCNC: 165 MG/DL (ref 0–99)
LYMPHOCYTES # BLD AUTO: 2.2 X10E3/UL (ref 0.7–3.1)
LYMPHOCYTES NFR BLD AUTO: 29 %
MCH RBC QN AUTO: 31.8 PG (ref 26.6–33)
MCHC RBC AUTO-ENTMCNC: 34 G/DL (ref 31.5–35.7)
MCV RBC AUTO: 93 FL (ref 79–97)
MONOCYTES # BLD AUTO: 0.3 X10E3/UL (ref 0.1–0.9)
MONOCYTES NFR BLD AUTO: 4 %
NEUTROPHILS # BLD AUTO: 5.1 X10E3/UL (ref 1.4–7)
NEUTROPHILS NFR BLD AUTO: 65 %
PHOSPHATE SERPL-MCNC: 2.8 MG/DL (ref 2.5–4.5)
PLATELET # BLD AUTO: 291 X10E3/UL (ref 150–379)
POTASSIUM SERPL-SCNC: 4 MMOL/L (ref 3.5–5.2)
PROT SERPL-MCNC: 7.9 G/DL (ref 6–8.5)
RBC # BLD AUTO: 4.06 X10E6/UL (ref 3.77–5.28)
SODIUM SERPL-SCNC: 141 MMOL/L (ref 134–144)
TRIGL SERPL-MCNC: 152 MG/DL (ref 0–149)
VLDLC SERPL CALC-MCNC: 30 MG/DL (ref 5–40)
WBC # BLD AUTO: 7.9 X10E3/UL (ref 3.4–10.8)

## 2018-01-09 NOTE — TELEPHONE ENCOUNTER
Refills approved and eRx'd to pharmacy at visit yesterday (atenolol and simvastatin). Other med refills addressed yesterday also.

## 2018-01-20 DIAGNOSIS — I10 ESSENTIAL HYPERTENSION: ICD-10-CM

## 2018-01-20 DIAGNOSIS — E78.00 HYPERCHOLESTEROLEMIA: ICD-10-CM

## 2018-01-22 RX ORDER — AMLODIPINE BESYLATE 5 MG/1
TABLET ORAL
Qty: 90 TAB | Refills: 1 | Status: SHIPPED | OUTPATIENT
Start: 2018-01-22 | End: 2018-03-14 | Stop reason: SDUPTHER

## 2018-01-22 RX ORDER — LOSARTAN POTASSIUM 100 MG/1
TABLET ORAL
Qty: 90 TAB | Refills: 1 | Status: SHIPPED | OUTPATIENT
Start: 2018-01-22 | End: 2018-03-14 | Stop reason: SDUPTHER

## 2018-01-22 RX ORDER — ATENOLOL 100 MG/1
100 TABLET ORAL DAILY
Qty: 90 TAB | Refills: 1 | Status: SHIPPED | OUTPATIENT
Start: 2018-01-22 | End: 2018-03-14 | Stop reason: SDUPTHER

## 2018-01-22 RX ORDER — SIMVASTATIN 20 MG/1
TABLET, FILM COATED ORAL
Qty: 90 TAB | Refills: 1 | Status: SHIPPED | OUTPATIENT
Start: 2018-01-22 | End: 2018-03-14 | Stop reason: SDUPTHER

## 2018-01-22 RX ORDER — HYDROCHLOROTHIAZIDE 25 MG/1
TABLET ORAL
Qty: 90 TAB | Refills: 1 | Status: SHIPPED | OUTPATIENT
Start: 2018-01-22 | End: 2018-03-14 | Stop reason: SDUPTHER

## 2018-01-23 NOTE — TELEPHONE ENCOUNTER
Requests for amlodipine, HCTZ, losartan and simvastatin approved as requested and sent to mail-order pharmacy. Clarification:  Atenolol refilled to local pharmacy at last visit, and sent to mail order with other refills as well.

## 2018-06-21 DIAGNOSIS — M17.0 OSTEOARTHRITIS OF BOTH KNEES, UNSPECIFIED OSTEOARTHRITIS TYPE: ICD-10-CM

## 2018-06-21 NOTE — TELEPHONE ENCOUNTER
Medication refill request:    Last Office Visit:1/8/2018  Next Office Visit:  No future appointments. Pharmacy verified. Yes    Patient requesting 90 day supply.

## 2018-07-02 RX ORDER — SULINDAC 200 MG/1
200 TABLET ORAL
Qty: 150 TAB | Refills: 1 | Status: SHIPPED | OUTPATIENT
Start: 2018-07-02 | End: 2020-11-12 | Stop reason: SDUPTHER

## 2018-07-09 ENCOUNTER — DOCUMENTATION ONLY (OUTPATIENT)
Dept: INTERNAL MEDICINE CLINIC | Age: 58
End: 2018-07-09

## 2018-07-09 NOTE — PROGRESS NOTES
PA for Terbinafine HCl 250MG tablets initiated,     Cover my meds response: Your PA has been faxed to the plan as a paper copy. Please contact the plan directly if you haven't received a determination in a typical timeframe. You will be notified of the determination via fax.      Key:[id number]

## 2018-10-10 DIAGNOSIS — I10 ESSENTIAL HYPERTENSION: ICD-10-CM

## 2018-10-10 DIAGNOSIS — E78.00 HYPERCHOLESTEROLEMIA: ICD-10-CM

## 2018-10-10 NOTE — TELEPHONE ENCOUNTER
Medication refill request:    Last Office Visit:   January 08, 2018  Next Office Visit:  No future appointments. Pharmacy verified. yes    Patient requesting 90 day supply.

## 2018-10-10 NOTE — TELEPHONE ENCOUNTER
Dr Isabel/refill  Received:  Today       [facility] Front Office Pool                     Patient is calling to check the status of her refill request to [address] [place] [address], on Hydrochlorothiazide and Simvastatin, please call pt at [phone]

## 2018-10-12 RX ORDER — HYDROCHLOROTHIAZIDE 25 MG/1
TABLET ORAL
Qty: 30 TAB | Refills: 0 | Status: SHIPPED | OUTPATIENT
Start: 2018-10-12 | End: 2018-10-16 | Stop reason: SDUPTHER

## 2018-10-12 RX ORDER — SIMVASTATIN 20 MG/1
TABLET, FILM COATED ORAL
Qty: 30 TAB | Refills: 0 | Status: SHIPPED | OUTPATIENT
Start: 2018-10-12 | End: 2018-10-16 | Stop reason: SDUPTHER

## 2018-10-12 NOTE — TELEPHONE ENCOUNTER
After verifying patient's name and , writer notified patient of Dr. Sunil Gomez recommendations. Patient states she will have to call the office back to schedule followup appt. Writer notified patient once followup is scheduled Dr. Sunil Gomez will send refill for 30 days to pharmacy, patient agrees. Patient given opportunity to ask questions and voice understanding. No further concerns.

## 2018-10-12 NOTE — TELEPHONE ENCOUNTER
Future Appointments  Date Time Provider Williams Barone   10/16/2018 8:00 AM MD RENO Seo     Appt scheduled as above--30-day refills approved for HCTZ and simvastatin. Receipt electronically verified by pharmacy. Atenolol and amlodipine refilled on 10-5-18.

## 2018-10-12 NOTE — TELEPHONE ENCOUNTER
Please have her schedule follow-up in next 30-days, then can refill for single 30-day supply until she is seen for follow-up. If she prefers 90-day supply, please have schedule prior to refill.

## 2018-10-16 ENCOUNTER — OFFICE VISIT (OUTPATIENT)
Dept: INTERNAL MEDICINE CLINIC | Age: 58
End: 2018-10-16

## 2018-10-16 VITALS
HEART RATE: 56 BPM | BODY MASS INDEX: 36.32 KG/M2 | SYSTOLIC BLOOD PRESSURE: 124 MMHG | DIASTOLIC BLOOD PRESSURE: 85 MMHG | HEIGHT: 62 IN | RESPIRATION RATE: 16 BRPM | TEMPERATURE: 97.5 F | WEIGHT: 197.38 LBS | OXYGEN SATURATION: 96 %

## 2018-10-16 DIAGNOSIS — S99.922A INJURY OF TOENAIL OF LEFT FOOT, INITIAL ENCOUNTER: ICD-10-CM

## 2018-10-16 DIAGNOSIS — I10 ESSENTIAL HYPERTENSION: Primary | ICD-10-CM

## 2018-10-16 DIAGNOSIS — R73.09 ELEVATED HEMOGLOBIN A1C: ICD-10-CM

## 2018-10-16 DIAGNOSIS — Z23 ENCOUNTER FOR IMMUNIZATION: ICD-10-CM

## 2018-10-16 DIAGNOSIS — E55.9 HYPOVITAMINOSIS D: ICD-10-CM

## 2018-10-16 DIAGNOSIS — E78.00 HYPERCHOLESTEROLEMIA: ICD-10-CM

## 2018-10-16 DIAGNOSIS — N18.1 STAGE 1 CHRONIC KIDNEY DISEASE: ICD-10-CM

## 2018-10-16 DIAGNOSIS — Z86.19 HISTORY OF ONYCHOMYCOSIS: ICD-10-CM

## 2018-10-16 RX ORDER — LOSARTAN POTASSIUM 100 MG/1
TABLET ORAL
Qty: 90 TAB | Refills: 1 | Status: SHIPPED | OUTPATIENT
Start: 2018-10-16 | End: 2019-03-29 | Stop reason: SDUPTHER

## 2018-10-16 RX ORDER — SIMVASTATIN 20 MG/1
TABLET, FILM COATED ORAL
Qty: 90 TAB | Refills: 1 | Status: SHIPPED | OUTPATIENT
Start: 2018-10-16 | End: 2019-03-29 | Stop reason: SDUPTHER

## 2018-10-16 RX ORDER — HYDROCHLOROTHIAZIDE 25 MG/1
TABLET ORAL
Qty: 90 TAB | Refills: 1 | Status: SHIPPED | OUTPATIENT
Start: 2018-10-16 | End: 2019-03-29 | Stop reason: SDUPTHER

## 2018-10-16 NOTE — PATIENT INSTRUCTIONS
1.  Please schedule follow-up in 6mo as reviewed, for fasting labs and blood pressure monitoring as reviewed. 2.  If you need referral to see Podiatry for left nail growth/re-growth problems, please let us know.

## 2018-10-16 NOTE — PROGRESS NOTES
History of Present Illness:   Jennifer Condon is a 60 y.o. female here for evaluation:    Chief Complaint   Patient presents with    Blood Pressure Check    Labs     Patient fasting at this time. She has had problems with cost of care which limited return on time. She has low/affordable costs for meds--one BP med $11--others much less. She had $35 co-pay for medical visits. Has a flat fee for labs. Last visit Jan 2018. Seen at [facility] Sept 2018 for infected nail left great toe--improved with antibiotic and drainage. Healed without problems--no podiatry eval needed currently. They cut toenail as part of therapy, and healing back, but with split still present with nail. She thinks can manage as healing without podiatry at this time. Nail resolved with terbinafine therapy x 3mo. Still had a few doses left when stopped. No further nail fungus noted currently. Has appt for eval for breast reduction surgery with plastic surgery through her insurance. No referral needed per pt. Prior to Admission medications    Medication Sig Start Date End Date Taking? Authorizing Provider   hydroCHLOROthiazide (HYDRODIURIL) 25 mg tablet TAKE 1 TABLET DAILY 10/12/18  Yes Jacquelin aNvarro MD   simvastatin (ZOCOR) 20 mg tablet TAKE 1 TABLET DAILY AT NIGHT 10/12/18  Yes Jacquelin Navarro MD   atenolol (TENORMIN) 100 mg tablet TAKE 1 TABLET DAILY 10/5/18  Yes Jacquelin Navarro MD   amLODIPine (NORVASC) 5 mg tablet TAKE 1 TABLET ONCE DAILY. TAKE WITH BLOOD PRESSURE   MEDICATIONS NIGHTLY. 10/5/18  Yes Jacquelin Navarro MD   sulindac (CLINORIL) 200 mg tablet Take 1 Tab by mouth two (2) times daily as needed for Pain. Take instead of BC powder or other NSAIDs (ibuprofen, naprosyn). 7/2/18  Yes Jacquelin Navarro MD   losartan (COZAAR) 100 mg tablet TAKE 1 TABLET DAILY 3/18/18  Yes Jacquelin Navarro MD   terbinafine HCl (LAMISIL) 250 mg tablet Take 1 Tab by mouth daily.  1/8/18   [employer] Doreen Horn MD   cetirizine (ZYRTEC) 10 mg tablet Take 1 Tab by mouth daily. 1/17/17   Mary Carney MD        ROS    Vitals:    10/16/18 0815   BP: 124/85   Pulse: (!) 56   Resp: 16   Temp: 97.5 °F (36.4 °C)   TempSrc: Oral   SpO2: 96%   Weight: 197 lb 6 oz (89.5 kg)   Height: 5' 2\" (1.575 m)   PainSc:   0 - No pain      Body mass index is 36.1 kg/(m^2). Physical Exam:     Physical Exam   Constitutional: She appears well-developed and well-nourished. No distress. HENT:   Head: Normocephalic and atraumatic. Eyes: Conjunctivae are normal. Right eye exhibits no discharge. Left eye exhibits no discharge. No scleral icterus. Cardiovascular: Normal rate, regular rhythm, normal heart sounds and intact distal pulses. Exam reveals no gallop and no friction rub. No murmur heard. Pulmonary/Chest: Effort normal and breath sounds normal. No respiratory distress. She has no wheezes. She has no rales. She exhibits no tenderness. Abdominal: Soft. Bowel sounds are normal. She exhibits no distension. There is no tenderness. Musculoskeletal: She exhibits no edema or tenderness. Neurological: She is alert. She exhibits normal muscle tone. Coordination normal.   Skin: Skin is warm. No rash noted. She is not diaphoretic. No erythema. No pallor. Psychiatric: She has a normal mood and affect. Her behavior is normal. Judgment and thought content normal.   Skin/nail exam (continued):    Left great toe nail with split at medial base of nail. No drainage or erythema noted. There is residual nail linearly at medial aspect nail bed. Care reviewed as nail heals, with podiatry eval PRN. No nail discoloration or thickening noted bilat toenails. Assessment and Plan:       ICD-10-CM ICD-9-CM    1. Essential hypertension I10 401.9 RENAL FUNCTION PANEL      hydroCHLOROthiazide (HYDRODIURIL) 25 mg tablet      losartan (COZAAR) 100 mg tablet   2.  Hypovitaminosis D E55.9 268.9 VITAMIN D, 25 HYDROXY   3. Stage 1 chronic kidney disease N18.1 585.1 RENAL FUNCTION PANEL   4. Elevated hemoglobin A1c R73.09 790.29 HEMOGLOBIN A1C WITH EAG   5. Hypercholesterolemia E78.00 272.0 HEPATIC FUNCTION PANEL      CK      LIPID PANEL      simvastatin (ZOCOR) 20 mg tablet   6. Encounter for immunization Z23 V03.89 INFLUENZA VIRUS VAC QUAD,SPLIT,PRESV FREE SYRINGE IM   7. History of onychomycosis Z86.19 V12.09    8. Injury of toenail of left foot, initial encounter S99.922A 959.7        1. Refill meds as 90-day supplies. 2.  Not taking supplement currently. 3,4,5:  Monitoring/fasting labs reviewed at visit. 6.  Updating influenza vaccine as reviewed/requested by pt. 7.  Resolved with prior terbinafine. 8.  Reviewed care. Eval with podiatry if needed. Follow-up Disposition:  Return in about 6 months (around 4/16/2019) for fasting labs, Blood Pressure follow-up, medication follow-up.  lab results and schedule of future lab studies reviewed with patient  reviewed medications and side effects in detail    For additional documentation of information and/or recommendations discussed this visit, please see notes in instructions. Plan and evaluation (above) reviewed with pt at visit  Patient voiced understanding of plan and provided with time to ask/review questions. After Visit Summary (AVS) provided to pt after visit with additional instructions as needed/reviewed.

## 2018-10-16 NOTE — MR AVS SNAPSHOT
[address] [employer] [address] 
[phone] Patient: Oscar Walsh MRN: [id number] :1960 Visit Information Date & Time Provider Department Dept. Phone Encounter #  
 10/16/2018  8:00 AM [facility] and Internal Medicine [phone] [id number] Follow-up Instructions Return in about 6 months (around 2019) for fasting labs, Blood Pressure follow-up, medication follow-up. Upcoming Health Maintenance Date Due Hepatitis C Screening 1960 Shingrix Vaccine Age 50> (1 of 2) 10/9/2009 BREAST CANCER SCRN MAMMOGRAM 2014 PAP AKA CERVICAL CYTOLOGY 2016 Influenza Age 5 to Adult 2018 COLONOSCOPY 2020 DTaP/Tdap/Td series (2 - Td) 10/4/2022 Allergies as of 10/16/2018  Review Complete On: 10/16/2018 By: Matias Corona MD  
  
 Severity Noted Reaction Type Reactions Ace Inhibitors  10/04/2012   Intolerance Cough Benazepril--stopped when off med. Codeine  10/16/2018    Other (comments)  
 fainting Hydroxyzine  2016    Rash Notes rash with regular use about one month after starting for itching/rash. No problems with Benadryl noted. Zyrtec [Cetirizine]  2016    Itching  
 rash Current Immunizations  Reviewed on 3/30/2015 Name Date Influenza Vaccine 2017 Influenza Vaccine (Quad) PF  Incomplete, 2/3/2014 Influenza Vaccine Split 10/4/2012 TDAP Vaccine 10/4/2012 Not reviewed this visit You Were Diagnosed With   
  
 Codes Comments Essential hypertension    -  Primary ICD-10-CM: I10 
ICD-9-CM: 401.9 Hypovitaminosis D     ICD-10-CM: E55.9 ICD-9-CM: 268.9 Stage 1 chronic kidney disease     ICD-10-CM: N18.1 ICD-9-CM: 585.1 Elevated hemoglobin A1c     ICD-10-CM: R73.09 
ICD-9-CM: 790.29 Hypercholesterolemia     ICD-10-CM: E78.00 ICD-9-CM: 272.0 Encounter for immunization     ICD-10-CM: X03 ICD-9-CM: V03.89 History of onychomycosis     ICD-10-CM: Z86.19 ICD-9-CM: V12.09 Injury of toenail of left foot, initial encounter     ICD-10-CM: L89.846W ICD-9-CM: 590. 7 Vitals BP Pulse Temp Resp Height(growth percentile) Weight(growth percentile) 124/85 (BP 1 Location: Left arm, BP Patient Position: Sitting) (!) 56 97.5 °F (36.4 °C) (Oral) 16 5' 2\" (1.575 m) 197 lb 6 oz (89.5 kg) SpO2 BMI OB Status Smoking Status 96% 36.1 kg/m2 Ablation Never Smoker BMI and BSA Data Body Mass Index Body Surface Area  
 36.1 kg/m 2 1.98 m 2 Preferred Pharmacy Pharmacy Name Phone [facility] [address] [phone] Your Updated Medication List  
  
   
This list is accurate as of 10/16/18  9:05 AM.  Always use your most recent med list. amLODIPine 5 mg tablet Commonly known as:  Carlus [place] TAKE 1 TABLET ONCE DAILY. TAKE WITH BLOOD PRESSURE   MEDICATIONS NIGHTLY. atenolol 100 mg tablet Commonly known as:  TENORMIN  
TAKE 1 TABLET DAILY  
  
 cetirizine 10 mg tablet Commonly known as:  ZYRTEC Take 1 Tab by mouth daily. hydroCHLOROthiazide 25 mg tablet Commonly known as:  HYDRODIURIL  
TAKE 1 TABLET DAILY losartan 100 mg tablet Commonly known as:  COZAAR  
TAKE 1 TABLET DAILY  
  
 simvastatin 20 mg tablet Commonly known as:  ZOCOR  
TAKE 1 TABLET DAILY AT NIGHT  
  
 sulindac 200 mg tablet Commonly known as:  CLINORIL Take 1 Tab by mouth two (2) times daily as needed for Pain. Take instead of BC powder or other NSAIDs (ibuprofen, naprosyn). Prescriptions Sent to Pharmacy Refills  
 hydroCHLOROthiazide (HYDRODIURIL) 25 mg tablet 1 Sig: TAKE 1 TABLET DAILY  Class: Normal  
 Pharmacy: [address] Ph #: [phone]  
 simvastatin (ZOCOR) 20 mg tablet 1  
 Sig: TAKE 1 TABLET DAILY AT NIGHT Class: Normal  
 Pharmacy: [facility] [address] Ph #: [phone]  
 losartan (COZAAR) 100 mg tablet 1 Sig: TAKE 1 TABLET DAILY Class: Normal  
 Pharmacy: [facility] [address] Ph #: [phone] We Performed the Following CK [id number] CPT(R)] HEMOGLOBIN A1C WITH EAG [01569 CPT(R)] HEPATIC FUNCTION PANEL [50935 CPT(R)] INFLUENZA VIRUS VAC QUAD,SPLIT,PRESV FREE SYRINGE IM [id number] CPT(R)] LIPID PANEL [19578 CPT(R)] RENAL FUNCTION PANEL [30109 CPT(R)] VITAMIN D, 25 HYDROXY [id number] CPT(R)] Follow-up Instructions Return in about 6 months (around 4/16/2019) for fasting labs, Blood Pressure follow-up, medication follow-up. Patient Instructions 1. Please schedule follow-up in 6mo as reviewed, for fasting labs and blood pressure monitoring as reviewed. 2.  If you need referral to see Podiatry for left nail growth/re-growth problems, please let us know. Introducing [facility] & HEALTH SERVICES! Dear Neha Amador: Thank you for requesting a [employer] account. Our records indicate that you already have an active [employer] account. You can access your account anytime at [email or web address] Did you know that you can access your hospital and ER discharge instructions at any time in [employer]? You can also review all of your test results from your hospital stay or ER visit. Additional Information If you have questions, please visit the Frequently Asked Questions section of the [employer] website at [email or web address] Remember, [employer] is NOT to be used for urgent needs. For medical emergencies, dial 911. Now available from your iPhone and Android! Please provide this summary of care documentation to your next provider. Your primary care clinician is listed as [address].  If you have any questions after today's visit, please call [phone].

## 2018-10-16 NOTE — PROGRESS NOTES
RM 16    Chief Complaint   Patient presents with    Blood Pressure Check    Labs     Patient fasting at this time. 1. Have you been to the ER, urgent care clinic since your last visit? Hospitalized since your last visit? Yes Reason for visit: [facility], last month, ingrown toe nail infected. 2. Have you seen or consulted any other health care providers outside of the [address] [place] [address] since your last visit? Include any pap smears or colon screening.  No    Health Maintenance Due   Topic Date Due    Hepatitis C Screening  1960    Shingrix Vaccine Age 50> (1 of 2) 10/09/2009    BREAST CANCER SCRN MAMMOGRAM  04/17/2014    PAP AKA CERVICAL CYTOLOGY  05/30/2016    Influenza Age 5 to Adult  08/01/2018     PHQ over the last two weeks 1/8/2018   Little interest or pleasure in doing things Not at all   Feeling down, depressed, irritable, or hopeless Several days   Total Score PHQ 2 1     Learning Assessment 10/16/2018   PRIMARY LEARNER Patient   HIGHEST LEVEL OF EDUCATION - PRIMARY LEARNER  -   BARRIERS PRIMARY LEARNER NONE   CO-LEARNER CAREGIVER -   PRIMARY LANGUAGE ENGLISH   LEARNER PREFERENCE PRIMARY -   ANSWERED BY patient   RELATIONSHIP SELF

## 2018-10-16 NOTE — LETTER
Name: Daniel Armstrong   Sex: female   : 1960  
[address] [address] [phone] (home) Current Immunizations: 
Immunization History Administered Date(s) Administered  Influenza Vaccine 2017  Influenza Vaccine (Quad) PF 2014, 10/16/2018  Influenza Vaccine Split 10/04/2012  TDAP Vaccine 10/04/2012 Allergies: Allergies as of 10/16/2018 - Review Complete 10/16/2018 Allergen Reaction Noted  Ace inhibitors Cough 10/04/2012  Codeine Other (comments) 10/16/2018  Hydroxyzine Rash 2016  Zyrtec [cetirizine] Itching 2016

## 2018-10-17 LAB
25(OH)D3+25(OH)D2 SERPL-MCNC: 10.9 NG/ML (ref 30–100)
ALBUMIN SERPL-MCNC: 4.7 G/DL (ref 3.5–5.5)
ALP SERPL-CCNC: 93 IU/L (ref 39–117)
ALT SERPL-CCNC: 13 IU/L (ref 0–32)
AST SERPL-CCNC: 19 IU/L (ref 0–40)
BILIRUB DIRECT SERPL-MCNC: 0.11 MG/DL (ref 0–0.4)
BILIRUB SERPL-MCNC: 0.4 MG/DL (ref 0–1.2)
BUN SERPL-MCNC: 27 MG/DL (ref 6–24)
BUN/CREAT SERPL: 22 (ref 9–23)
CALCIUM SERPL-MCNC: 9.8 MG/DL (ref 8.7–10.2)
CHLORIDE SERPL-SCNC: 94 MMOL/L (ref 96–106)
CHOLEST SERPL-MCNC: 263 MG/DL (ref 100–199)
CK SERPL-CCNC: 171 U/L (ref 24–173)
CO2 SERPL-SCNC: 22 MMOL/L (ref 20–29)
CREAT SERPL-MCNC: 1.25 MG/DL (ref 0.57–1)
EST. AVERAGE GLUCOSE BLD GHB EST-MCNC: 114 MG/DL
GLUCOSE SERPL-MCNC: 109 MG/DL (ref 65–99)
HBA1C MFR BLD: 5.6 % (ref 4.8–5.6)
HDLC SERPL-MCNC: 56 MG/DL
LDLC SERPL CALC-MCNC: 177 MG/DL (ref 0–99)
PHOSPHATE SERPL-MCNC: 3.4 MG/DL (ref 2.5–4.5)
POTASSIUM SERPL-SCNC: 4.1 MMOL/L (ref 3.5–5.2)
PROT SERPL-MCNC: 7.9 G/DL (ref 6–8.5)
SODIUM SERPL-SCNC: 135 MMOL/L (ref 134–144)
TRIGL SERPL-MCNC: 151 MG/DL (ref 0–149)
VLDLC SERPL CALC-MCNC: 30 MG/DL (ref 5–40)

## 2019-01-08 DIAGNOSIS — I10 ESSENTIAL HYPERTENSION: ICD-10-CM

## 2019-01-08 NOTE — TELEPHONE ENCOUNTER
Medication refill request:    Last Office Visit:  October 16, 2018   Next Office Visit:  No future appointments. Pharmacy verified.   Yes

## 2019-01-13 RX ORDER — ATENOLOL 100 MG/1
100 TABLET ORAL DAILY
Qty: 90 TAB | Refills: 1 | Status: SHIPPED | OUTPATIENT
Start: 2019-01-13 | End: 2019-09-15 | Stop reason: SDUPTHER

## 2019-01-30 RX ORDER — AMLODIPINE BESYLATE 5 MG/1
TABLET ORAL
Qty: 90 TAB | Refills: 1 | Status: SHIPPED | OUTPATIENT
Start: 2019-01-30 | End: 2019-09-15 | Stop reason: SDUPTHER

## 2019-01-30 NOTE — TELEPHONE ENCOUNTER
LOV:  Oct 2018. No future appointments. Refill request(s) approved--amlodipine. [place] [employer] message to pt to schedule follow-up.

## 2019-03-20 DIAGNOSIS — I10 ESSENTIAL HYPERTENSION: ICD-10-CM

## 2019-03-20 DIAGNOSIS — E78.00 HYPERCHOLESTEROLEMIA: ICD-10-CM

## 2019-03-29 ENCOUNTER — OFFICE VISIT (OUTPATIENT)
Dept: INTERNAL MEDICINE CLINIC | Age: 59
End: 2019-03-29

## 2019-03-29 VITALS
WEIGHT: 195.13 LBS | HEIGHT: 62 IN | DIASTOLIC BLOOD PRESSURE: 77 MMHG | OXYGEN SATURATION: 95 % | SYSTOLIC BLOOD PRESSURE: 123 MMHG | BODY MASS INDEX: 35.91 KG/M2 | RESPIRATION RATE: 14 BRPM | HEART RATE: 72 BPM | TEMPERATURE: 98.2 F

## 2019-03-29 DIAGNOSIS — E78.00 HYPERCHOLESTEROLEMIA: ICD-10-CM

## 2019-03-29 DIAGNOSIS — I10 ESSENTIAL HYPERTENSION: ICD-10-CM

## 2019-03-29 DIAGNOSIS — E55.9 HYPOVITAMINOSIS D: ICD-10-CM

## 2019-03-29 DIAGNOSIS — G89.29 CHRONIC PAIN OF RIGHT KNEE: ICD-10-CM

## 2019-03-29 DIAGNOSIS — M25.561 CHRONIC PAIN OF RIGHT KNEE: ICD-10-CM

## 2019-03-29 DIAGNOSIS — R73.09 ELEVATED HEMOGLOBIN A1C: Primary | ICD-10-CM

## 2019-03-29 DIAGNOSIS — Z02.89 ENCOUNTER FOR COMPLETION OF FORM WITH PATIENT: ICD-10-CM

## 2019-03-29 RX ORDER — ERGOCALCIFEROL 1.25 MG/1
50000 CAPSULE ORAL
Qty: 15 CAP | Refills: 0 | Status: SHIPPED | OUTPATIENT
Start: 2019-03-29 | End: 2019-07-06

## 2019-03-29 RX ORDER — ATENOLOL 100 MG/1
TABLET ORAL
Qty: 90 TAB | Refills: 1 | OUTPATIENT
Start: 2019-03-29

## 2019-03-29 RX ORDER — SIMVASTATIN 20 MG/1
TABLET, FILM COATED ORAL
Qty: 30 TAB | Refills: 0 | OUTPATIENT
Start: 2019-03-29

## 2019-03-29 RX ORDER — CEPHRADINE 500 MG
2 CAPSULE ORAL
Qty: 30 CAP | Refills: 1 | Status: SHIPPED | OUTPATIENT
Start: 2019-06-27 | End: 2020-03-24 | Stop reason: DRUGHIGH

## 2019-03-29 RX ORDER — SIMVASTATIN 20 MG/1
TABLET, FILM COATED ORAL
Qty: 90 TAB | Refills: 1 | Status: SHIPPED | OUTPATIENT
Start: 2019-03-29 | End: 2020-01-17

## 2019-03-29 RX ORDER — HYDROCHLOROTHIAZIDE 25 MG/1
TABLET ORAL
Qty: 90 TAB | Refills: 1 | Status: SHIPPED | OUTPATIENT
Start: 2019-03-29 | End: 2020-01-17

## 2019-03-29 RX ORDER — LOSARTAN POTASSIUM 100 MG/1
TABLET ORAL
Qty: 90 TAB | Refills: 1 | OUTPATIENT
Start: 2019-03-29

## 2019-03-29 RX ORDER — LOSARTAN POTASSIUM 100 MG/1
TABLET ORAL
Qty: 90 TAB | Refills: 1 | Status: SHIPPED | OUTPATIENT
Start: 2019-03-29 | End: 2020-02-05 | Stop reason: SDUPTHER

## 2019-03-29 RX ORDER — AMLODIPINE BESYLATE 5 MG/1
TABLET ORAL
Qty: 90 TAB | Refills: 0 | OUTPATIENT
Start: 2019-03-29

## 2019-03-29 NOTE — PATIENT INSTRUCTIONS
1.  Obtain fasting labs as reviewed--due mid-April, but can do any time. 2.  Start the printed Vit D script (cholecalciferol) once you complete the 50,000 weekly dose sent to pharmacy.

## 2019-03-29 NOTE — PROGRESS NOTES
RM 17    Patient not fasting at this time. Chief Complaint   Patient presents with    Blood Pressure Check     followup      1. Have you been to the ER, urgent care clinic since your last visit? Hospitalized since your last visit? No    2. Have you seen or consulted any other health care providers outside of the [address] [place] [address] since your last visit? Include any pap smears or colon screening. No    Health Maintenance Due   Topic Date Due    Hepatitis C Screening  1960    Shingrix Vaccine Age 50> (1 of 2) 10/09/2009    BREAST CANCER SCRN MAMMOGRAM  04/17/2014    PAP AKA CERVICAL CYTOLOGY  05/30/2016         3 most recent PHQ Screens 3/29/2019   Little interest or pleasure in doing things Not at all   Feeling down, depressed, irritable, or hopeless Not at all   Total Score PHQ 2 0     Abuse Screening Questionnaire 3/29/2019   Do you ever feel afraid of your partner? N   Are you in a relationship with someone who physically or mentally threatens you? N   Is it safe for you to go home?  Y         Learning Assessment 3/29/2019   PRIMARY LEARNER Patient   HIGHEST LEVEL OF EDUCATION - PRIMARY LEARNER  -   BARRIERS PRIMARY LEARNER NONE   CO-LEARNER CAREGIVER -   PRIMARY LANGUAGE ENGLISH   LEARNER PREFERENCE PRIMARY READING     PICTURES   ANSWERED BY patient   RELATIONSHIP SELF

## 2019-03-29 NOTE — PROGRESS NOTES
History of Present Illness:   Erik Benz is a 60 y.o. female here for evaluation:    Chief Complaint   Patient presents with    Blood Pressure Check     followup      Notes less problems with follow-up than prior. She notes no limitations with lab costs or visit follow-up. Reviewed last labs. Not taking Vit D supplement. Reviewed supplements as below. Reviewed lipid goals and follow-up labs. Refills reviewed as below at visit. She notes right knee pain. Worse with prolonged standing. Reviewed ortho eval.  She also notes some swelling end of day. Reviewed ortho to consider Synvisc if eligible. She will consider but not interested at this time. Reviewed OTC neoprene brace PRN to help with support/swelling. Nursing screenings reviewed by provider at visit. Prior to Admission medications    Medication Sig Start Date End Date Taking? Authorizing Provider   amLODIPine (NORVASC) 5 mg tablet TAKE 1 TABLET ONCE DAILY   WITH BLOOD PRESSURE        MEDICATIONS NIGHTLY 1/30/19  Yes Gus Jennings MD   atenolol (TENORMIN) 100 mg tablet Take 1 Tab by mouth daily. 1/13/19  Yes Gus Jennings MD   hydroCHLOROthiazide (HYDRODIURIL) 25 mg tablet TAKE 1 TABLET DAILY 10/16/18  Yes Gus Jennings MD   simvastatin (ZOCOR) 20 mg tablet TAKE 1 TABLET DAILY AT NIGHT 10/16/18  Yes Gus Jennings MD   losartan (COZAAR) 100 mg tablet TAKE 1 TABLET DAILY 10/16/18  Yes Gus Jennings MD   sulindac (CLINORIL) 200 mg tablet Take 1 Tab by mouth two (2) times daily as needed for Pain. Take instead of BC powder or other NSAIDs (ibuprofen, naprosyn). 7/2/18  Yes Gus Jennings MD   cetirizine (ZYRTEC) 10 mg tablet Take 1 Tab by mouth daily.  1/17/17   Gus Jennings MD        ROS    Vitals:    03/29/19 1403   BP: 123/77   Pulse: 72   Resp: 14   Temp: 98.2 °F (36.8 °C)   TempSrc: Oral   SpO2: 95%   Weight: 195 lb 2 oz (88.5 kg)   Height: 5' 2\" (1.575 m) PainSc:   0 - No pain      Body mass index is 35.69 kg/m². Physical Exam:     Physical Exam   Constitutional: She appears well-developed and well-nourished. No distress. HENT:   Head: Normocephalic and atraumatic. Eyes: Conjunctivae are normal. Right eye exhibits no discharge. Left eye exhibits no discharge. No scleral icterus. Neck: Neck supple. Cardiovascular: Normal rate, regular rhythm, normal heart sounds and intact distal pulses. Exam reveals no gallop and no friction rub. No murmur heard. Pulmonary/Chest: Effort normal and breath sounds normal. No respiratory distress. She has no wheezes. She has no rales. She exhibits no tenderness. Abdominal: Soft. Bowel sounds are normal. She exhibits no distension. There is no tenderness. Musculoskeletal: She exhibits no edema or tenderness. Neurological: She is alert. She exhibits normal muscle tone. Coordination normal.   Skin: Skin is warm. No rash noted. She is not diaphoretic. No erythema. No pallor. Psychiatric: She has a normal mood and affect. Her behavior is normal. Judgment and thought content normal.       Assessment and Plan:       ICD-10-CM ICD-9-CM    1. Elevated hemoglobin A1c R73.09 790.29 HEMOGLOBIN A1C WITH EAG   2. Essential hypertension I10 401.9 hydroCHLOROthiazide (HYDRODIURIL) 25 mg tablet      losartan (COZAAR) 100 mg tablet      METABOLIC PANEL, BASIC   3. Hypercholesterolemia E78.00 272.0 simvastatin (ZOCOR) 20 mg tablet      METABOLIC PANEL, BASIC      HEPATIC FUNCTION PANEL      LIPID PANEL      CK   4. Hypovitaminosis D E55.9 268.9 ergocalciferol (ERGOCALCIFEROL) 50,000 unit capsule      cholecalciferol, vitamin d3, 10,000 unit cap       1,2,3:  Refills and labs reviewed. She is due to repeat labs mid-April 2019. She works at [employer] now and will just repeat there as reviewed. 4.  To start Vit D 50K weekly for 15 weeks, then fill (printed) 10K unit tab.   Once completes 50K weekly will start 20K weekly until follow-up. Follow-up and Dispositions    · Return in about 3 months (around 6/29/2019) for lab review, Blood Pressure follow-up.       lab results and schedule of future lab studies reviewed with patient  reviewed medications and side effects in detail  radiology results and schedule of future radiology studies reviewed with patient--right knee x-ray from 2012--tricompartmental OA. For additional documentation of information and/or recommendations discussed this visit, please see notes in instructions. Plan and evaluation (above) reviewed with pt at visit  Patient voiced understanding of plan and provided with time to ask/review questions. After Visit Summary (AVS) provided to pt after visit with additional instructions as needed/reviewed.

## 2019-03-29 NOTE — TELEPHONE ENCOUNTER
Refilled meds as requested/needed at visit--losartan, simvastatin. Has refills remaining on amlodipine and atenolol.

## 2019-04-13 ENCOUNTER — HOSPITAL ENCOUNTER (EMERGENCY)
Age: 59
Discharge: HOME | End: 2019-04-13
Attending: EMERGENCY MEDICINE
Payer: COMMERCIAL

## 2019-04-13 ENCOUNTER — APPOINTMENT (OUTPATIENT)
Dept: GENERAL RADIOLOGY | Age: 59
End: 2019-04-13
Attending: PHYSICIAN ASSISTANT
Payer: COMMERCIAL

## 2019-04-13 VITALS
HEIGHT: 62 IN | SYSTOLIC BLOOD PRESSURE: 157 MMHG | WEIGHT: 195 LBS | HEART RATE: 66 BPM | RESPIRATION RATE: 16 BRPM | DIASTOLIC BLOOD PRESSURE: 76 MMHG | TEMPERATURE: 97.5 F | BODY MASS INDEX: 35.88 KG/M2 | OXYGEN SATURATION: 100 %

## 2019-04-13 DIAGNOSIS — M25.512 ACUTE PAIN OF LEFT SHOULDER: Primary | ICD-10-CM
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
## 2019-04-13 DIAGNOSIS — M17.0 OSTEOARTHRITIS OF BOTH KNEES, UNSPECIFIED OSTEOARTHRITIS TYPE: ICD-10-CM

## 2019-04-13 PROCEDURE — 99282 EMERGENCY DEPT VISIT SF MDM: CPT

## 2019-04-13 PROCEDURE — 73030 X-RAY EXAM OF SHOULDER: CPT
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
## 2019-06-10 NOTE — TELEPHONE ENCOUNTER
Assessment/Plan


Assessment/Plan


Assessment/Plan (Daily)


Assessment and recommendations;





1.  Patient admitted with asthma exacerbation and acute bronchitis, currently on


appropriate treatment regimen.  Still having persistent symptoms.


2.  Apparently poorly controlled asthma on an outpatient basis.


3.  History of diabetes and hypertension.


4.  Mild elevation in serum creatinine likely induced by Lasix.





Continue current supportive care.  Discontinue Lasix.  Monitor renal function.





Consultation Date/Type/Reason


Admit Date/Time


Jun 8, 2019 at 17:27


Initial Consult Date


6/9/19


Type of Consult


Pulmonary





Patient is a pleasant 69-year-old gentleman who came into the hospital with a 


few days history of increased wheezing, coughing, production of white-yellow 


sputum.  Upon evaluation patient has been diagnosed with asthma exacerbation and


treated with appropriate modality regimen.  Patient is reporting some 


improvement since admission.  Patient does complain of chronic symptoms of 


asthma which apparently is poorly controlled on an outpatient basis.  He does 


complain of frequent flareups as well.  Patient however denies any high fever, 


chills, body aches or myalgias and also denies any sinus symptoms.





Past medical history; 





1.  Asthma


2.  Diabetes and hypertension.





Medications; reviewed.


Allergies; sulfa drugs.


Social history; noncontributory.


Family history; he is single, he does not have any children.  No show any asthma


in the family.


Occupational history; patient is on disability.


Review of systems; denies any headache, seizures, sinus symptoms.  Any dysph


agia.  Any chest pain or angina.  Complains of cough with wheezing as well as 


mild dyspnea on exertion.  Denies any abdominal pain, nausea vomiting.  Any 


melena or hematochezia.  Any urinary symptoms.  Denies any weight gain.  Denies 


any orthopnea.  Complains of occasional snoring and daytime sleepiness.


General exam; elderly male, looks younger than age.  Currently no distress.  


Laying comfortably in bed.


Date/Time of Note


DATE: 6/10/19 


TIME: 11:40





24 HR Interval Summary


Free Text/Dictation


Patient's condition is unchanged.  Still complains of cough and wheezing.


General exam; elderly male, awake alert, laying comfortably in bed.  Currently 


in no distress.





Exam/Review of Systems


Exam


Vitals





Vital Signs


  Date      Temp  Pulse  Resp  B/P (MAP)   Pulse Ox  O2          O2 Flow    FiO2


Time                                                 Delivery    Rate


   6/10/19  97.8     52    20      139/74        94  Nasal


     11:11                           (95)            Cannula


   6/10/19                                                             3.0


     07:50


   6/10/19                                                                    31


     03:10








Intake and Output





6/9/19


6/9/19


6/10/19





1515:00


23:00


07:00





IntakeIntake Total


1200 ml





OutputOutput Total


1100 ml





BalanceBalance


100 ml











Exam


HEENT exam; supple, no JVD.  No lymphadenopathy.  Midline trachea.  No thyro


megaly.  Patient has fair dentition.


Chest exam; mild bilateral expiratory wheezing.  S1-S2 audible, no murmurs.  


Regular rhythm.


Abdomen exam; soft, nontender.  No organomegaly.  Bowel sounds audible.


Extremity exam; no peripheral edema clubbing.


CNS exam; no focal deficit.





Results


Result Diagram:  


6/10/19 0755                                                                    


           6/10/19 0755





Results 24hrs





Laboratory Tests


Test
                   6/9/19
17:46  6/9/19
21:42  6/10/19
02:10  6/10/19
07:55


Bedside Glucose               296  H        321  H         278  H


White Blood Count                                                       20.6  #H


Red Blood Count                                                           4.96


Hemoglobin                                                                14.2


Hematocrit                                                               41.6  L


Mean Corpuscular                                                          83.9


Volume


Mean Corpuscular                                                         28.6  L


Hemoglobin


Mean Corpuscular       
              
             
                    34.1  



Hemoglobin
Concent


Red Cell Distribution                                                     14.0


Width


Platelet Count                                                             261


Mean Platelet Volume                                                       9.8


Immature Granulocytes                                                   0.800  H


%


Neutrophils %                                                            88.0  H


Lymphocytes %                                                             5.5  L


Monocytes %                                                                5.5


Eosinophils %                                                              0.0


Basophils %                                                                0.2


Nucleated Red Blood                                                        0.0


Cells %


Immature Granulocytes                                                   0.170  H


#


Neutrophils #                                                            18.1  H


Lymphocytes #                                                              1.1


Monocytes #                                                               1.1  H


Eosinophils #                                                              0.0


Basophils #                                                                0.0


Nucleated Red Blood                                                        0.0


Cells #


Sodium Level                                                               139


Potassium Level                                                            3.6


Chloride Level                                                             103


Carbon Dioxide Level                                                        24


Anion Gap                                                                   12


Blood Urea Nitrogen                                                       37  #H


Creatinine                                                               1.41  H


Est Glomerular         
              
             
                     50  L



Filtrat Rate
mL/min


Glucose Level                                                             296  H


Calcium Level                                                              8.8


Phosphorus Level                                                           3.1


Magnesium Level                                                            1.7


Test
                  6/10/19
08:28  
             
              



Bedside Glucose               271  H








Medications


Medication





Current Medications


IV Flush (NS 3 ml) 3 ml PER PROTOCOL IV ;  Start 6/8/19 at 20:00


Ondansetron HCl (Zofran Inj) 4 mg Q6H  PRN IV NAUSEA/VOMITING;  Start 6/8/19 at 


20:00


Acetaminophen (Tylenol Tab) 650 mg Q6H  PRN PO .PAIN 1-3 OR TEMP;  Start 6/8/19 


at 20:00


Acetaminophen/ Hydrocodone Bitart (Norco (5/325)) 1 tab Q6H  PRN PO .MOD PAIN 4-


6;  Start 6/8/19 at 20:00


Morphine Sulfate (morphine) 2 mg Q4H  PRN IV .SEVERE PAIN 7-10;  Start 6/8/19 at


20:00


Docusate Sodium (Colace) 100 mg Q12H  PRN PO .CONSTIPATION;  Start 6/8/19 at 


20:00


Zolpidem Tartrate (Ambien) 5 mg QHS  PRN PO .INSOMNIA Last administered on 


6/10/19at 00:08; Admin Dose 5 MG;  Start 6/8/19 at 20:00


Enoxaparin Sodium (Lovenox) 40 mg DAILY SC  Last administered on 6/10/19at 


08:58; Admin Dose 40 MG;  Start 6/9/19 at 09:00


Albuterol/ Ipratropium (Duoneb) 3 ml Q4H RESP THERAPY  PRN HHN SHORTNESS OF DIMPLE


TH Last administered on 6/10/19at 02:57; Admin Dose 3 ML;  Start 6/8/19 at 20:00


Atorvastatin Calcium (Lipitor) 20 mg QHS PO  Last administered on 6/9/19at 


21:39; Admin Dose 20 MG;  Start 6/8/19 at 21:00


Lisinopril (Zestril) 40 mg DAILY PO  Last administered on 6/10/19at 08:47; Admin


Dose 40 MG;  Start 6/9/19 at 09:00


Tamsulosin HCl (Flomax) 0.4 mg DAILY PO  Last administered on 6/10/19at 08:47; A


dmin Dose 0.4 MG;  Start 6/9/19 at 09:00


Venlafaxine HCl (Effexor) 50 mg DAILY PO ;  Start 6/9/19 at 09:00


Miscellaneous Information 1 ea NOTE XX ;  Start 6/8/19 at 20:30


Glucose (Glutose) 15 gm Q15M  PRN PO DECREASED GLUCOSE;  Start 6/8/19 at 20:30


Glucose (Glutose) 22.5 gm Q15M  PRN PO DECREASED GLUCOSE;  Start 6/8/19 at 20:30


Dextrose (D50w Syringe) 25 ml Q15M  PRN IV DECREASED GLUCOSE;  Start 6/8/19 at 


20:30


Dextrose (D50w Syringe) 50 ml Q15M  PRN IV DECREASED GLUCOSE;  Start 6/8/19 at 


20:30


Glucagon (Glucagen) 1 mg Q15M  PRN IM DECREASED GLUCOSE;  Start 6/8/19 at 20:30


Glucose (Glutose) 15 gm Q15M  PRN BUCCAL DECREASED GLUCOSE;  Start 6/8/19 at 


20:30


Methylprednisolone Sodium Succinate (Solu-Medrol) 40 mg Q8 IV  Last administered


on 6/10/19at 06:37; Admin Dose 40 MG;  Start 6/9/19 at 14:00


Budesonide (Pulmicort (Neb)) 0.5 mg BID RESP  THERAPY HHN  Last administered on 


6/10/19at 07:40; Admin Dose 0.5 MG;  Start 6/9/19 at 09:30


Albuterol/ Ipratropium (Duoneb) 3 ml Q6HWA RESP  THERAPY HHN  Last administered 


on 6/10/19at 07:40; Admin Dose 3 ML;  Start 6/9/19 at 14:00


Azithromycin (Zithromax) 250 mg NOW PO  Last administered on 6/10/19at 08:47; 


Admin Dose 250 MG;  Start 6/10/19 at 09:00;  Stop 6/13/19 at 09:01


Furosemide (Lasix) 20 mg DAILY PO  Last administered on 6/10/19at 08:48; Admin 


Dose 20 MG;  Start 6/10/19 at 09:00


Insulin Aspart (Novolog Insulin Pen) NOVOLOG *MODERATE* ALGORITHM WITH MEALS  


BEDTIME SC  Last administered on 6/10/19at 08:58; Admin Dose 8 UNIT;  Start 6 /9/19 at 21:00


Insulin Aspart (Novolog Insulin Pen) 15 unit WITH  MEALS SC ;  Start 6/10/19 at 


11:50


Insulin Glargine (Lantus) 30 units DAILY@2000 SC ;  Start 6/10/19 at 20:00


Metoprolol Succinate (Toprol Xl) 50 mg DAILY PO ;  Start 6/10/19 at 11:00











PARKER FIORE                    Domo 10, 2019 11:42 Last refill for #50 tabs with 1 refill Jan 2018. Refill request(s) approved--Sulindac.

## 2019-09-15 DIAGNOSIS — I10 ESSENTIAL HYPERTENSION: ICD-10-CM

## 2019-09-15 RX ORDER — ATENOLOL 100 MG/1
TABLET ORAL
Qty: 90 TAB | Refills: 1 | Status: SHIPPED | OUTPATIENT
Start: 2019-09-15 | End: 2020-03-19 | Stop reason: SDUPTHER

## 2019-09-15 RX ORDER — AMLODIPINE BESYLATE 5 MG/1
TABLET ORAL
Qty: 90 TAB | Refills: 1 | Status: SHIPPED | OUTPATIENT
Start: 2019-09-15 | End: 2020-03-19 | Stop reason: SDUPTHER

## 2019-09-16 NOTE — TELEPHONE ENCOUNTER
Refill request(s) approved--atenolol, amlodipine. MyChart message to pt--to schedule follow-up appt.

## 2020-01-13 DIAGNOSIS — E78.00 HYPERCHOLESTEROLEMIA: ICD-10-CM

## 2020-01-13 DIAGNOSIS — I10 ESSENTIAL HYPERTENSION: ICD-10-CM

## 2020-01-17 RX ORDER — HYDROCHLOROTHIAZIDE 25 MG/1
TABLET ORAL
Qty: 30 TAB | Refills: 0 | Status: SHIPPED | OUTPATIENT
Start: 2020-01-17 | End: 2020-03-19 | Stop reason: SDUPTHER

## 2020-01-17 RX ORDER — SIMVASTATIN 20 MG/1
TABLET, FILM COATED ORAL
Qty: 30 TAB | Refills: 0 | Status: SHIPPED | OUTPATIENT
Start: 2020-01-17 | End: 2020-03-19 | Stop reason: SDUPTHER

## 2020-01-18 NOTE — TELEPHONE ENCOUNTER
LOV March 2019. Future Appointments   Date Time Provider Williams Barone   2/5/2020  8:15 AM Winston Magallanes MD [address] [facility]     Last labs 10/2018. Refill request(s) approved--simvastatin, HCTZ--30-day supply with 0 refill(s). MyChart message to pt to fast for labs at upcoming appt.

## 2020-01-28 ENCOUNTER — HOSPITAL ENCOUNTER (OUTPATIENT)
Dept: MAMMOGRAPHY | Age: 60
Discharge: HOME | End: 2020-01-28
Payer: COMMERCIAL

## 2020-01-28 DIAGNOSIS — Z12.31 VISIT FOR SCREENING MAMMOGRAM: ICD-10-CM

## 2020-01-28 PROCEDURE — 77067 SCR MAMMO BI INCL CAD: CPT

## 2020-02-05 DIAGNOSIS — I10 ESSENTIAL HYPERTENSION: ICD-10-CM

## 2020-02-09 RX ORDER — LOSARTAN POTASSIUM 100 MG/1
TABLET ORAL
Qty: 90 TAB | Refills: 0 | Status: SHIPPED | OUTPATIENT
Start: 2020-02-09 | End: 2020-03-19 | Stop reason: SDUPTHER

## 2020-02-09 NOTE — TELEPHONE ENCOUNTER
Future Appointments   Date Time Provider Williams Barone   3/19/2020  9:45 AM Cameron Thomason MD [facility] SCHED     Refill request(s) approved--losartan--90-day supply with 0 refill(s). Refill protocol details (computer-generated) reviewed.

## 2020-02-10 NOTE — PROGRESS NOTES
History of Present Illness:   David Diaz is a 61 y.o. female here for evaluation:    Chief Complaint   Patient presents with    Medication Evaluation    Blood Pressure Check     Notes:  Patient is here today for medication evaluation and a b/p check  Per patient her B/p has been high due to out of medication. States she needs refills on medications    LOV May 2017. She had BP med refills on July 24, 2017, for 90-day supplies with 1 refill of amlodipine, losartan, HCTZ to mail-order pharmacy. She had atenolol refill May 2017 with 30-day supply and 5 refills, since there was a shortage/pharmacy availability problem with atenolol. She notes just out of atenolol recently. Refills reviewed. She prefers as 90--day to local pharmacy. Not using mail-order currently. She has fasted for labs today. She also requested eval and treatment for left foot toenail fungus. Notes only on left foot. No prior therapy. Reviewed terbinafine, lab monitoring with statin, and usually duration therapy (3-6mo). She is agreeable to plan. She notes holidays hard due to death of maternal uncle. She was also working a lot over holidays. Prior to Admission medications    Medication Sig Start Date End Date Taking? Authorizing Provider   hydroCHLOROthiazide (HYDRODIURIL) 25 mg tablet TAKE 1 TABLET DAILY 7/24/17  Yes Abby Costa MD   losartan (COZAAR) 100 mg tablet TAKE 1 TABLET DAILY 7/24/17  Yes Abby Costa MD   simvastatin (ZOCOR) 20 mg tablet TAKE 1 TABLET DAILY AT NIGHT 7/24/17  Yes Abby Costa MD   atenolol (TENORMIN) 50 mg tablet TAKE TWO TABLET BY MOUTH ONCE DAILY. Take instead of single 100mg tablet (out of stock). 5/23/17  Yes Abby Costa MD   sulindac (CLINORIL) 200 mg tablet Take 1 Tab by mouth two (2) times daily as needed for Pain. Take instead of BC powder or other NSAIDs (ibuprofen, naprosyn).  1/17/17  Yes Abby Costa MD   oxyCODONE-acetaminophen (PERCOCET) 5-325 mg per tablet Take 1 Tab by mouth every six (6) hours as needed for Pain. Max Daily Amount: 4 Tabs. 10/15/17   GABINO Coreas   predniSONE (STERAPRED) 5 mg dose pack See administration instruction per 5mg dose pack 10/15/17   GABINO Coreas   amLODIPine (NORVASC) 5 mg tablet TAKE 1 TABLET ONCE DAILY. TAKE WITH OTHER BLOOD PRESSURE MEDICINES NIGHTLY 7/24/17   Angie Weston MD   cetirizine (ZYRTEC) 10 mg tablet Take 1 Tab by mouth daily. 1/17/17   Angie Weston MD        ROS    Vitals:    01/08/18 1338 01/08/18 1346   BP: (!) 156/95 (!) 150/97   Pulse: 71 70   Resp: 16    Temp: 98.3 °F (36.8 °C)    TempSrc: Oral    SpO2: 97%    Weight: 198 lb (89.8 kg)    Height: 5' 2\" (1.575 m)    PainSc:   0 - No pain         Physical Exam:     Physical Exam   Constitutional: She appears well-developed and well-nourished. No distress. HENT:   Head: Normocephalic and atraumatic. Eyes: Conjunctivae are normal. Right eye exhibits no discharge. Left eye exhibits no discharge. No scleral icterus. Neck: Neck supple. Cardiovascular: Normal rate, regular rhythm, normal heart sounds and intact distal pulses. Exam reveals no gallop and no friction rub. No murmur heard. Pulmonary/Chest: Effort normal and breath sounds normal. No respiratory distress. She has no wheezes. She has no rales. She exhibits no tenderness. Abdominal: Soft. Bowel sounds are normal. She exhibits no distension. There is no tenderness. Musculoskeletal: She exhibits no edema or tenderness. Neurological: She is alert. She exhibits normal muscle tone. Coordination normal.   Skin: Skin is warm. No rash noted. She is not diaphoretic. No erythema. No pallor. Psychiatric: She has a normal mood and affect. Her behavior is normal. Judgment and thought content normal.   Skin (continued):  Onychomycosis with thickened, dystrophic nails left foot/toes 1,2,4,5 (3rd toenail clear).       Assessment and Plan: ICD-10-CM ICD-9-CM    1. Onychomycosis B35.1 110.1 terbinafine HCl (LAMISIL) 250 mg tablet   2. Essential hypertension I10 401.9 atenolol (TENORMIN) 100 mg tablet      hydroCHLOROthiazide (HYDRODIURIL) 25 mg tablet      losartan (COZAAR) 100 mg tablet      CBC WITH AUTOMATED DIFF      RENAL FUNCTION PANEL   3. Hypercholesterolemia E78.00 272.0 simvastatin (ZOCOR) 20 mg tablet      LIPID PANEL      CK      HEPATIC FUNCTION PANEL   4. Elevated hemoglobin A1c R73.09 790.29 HEMOGLOBIN A1C WITH EAG   5. Hypovitaminosis D E55.9 268.9 VITAMIN D, 25 HYDROXY   6. Osteoarthritis of both knees, unspecified osteoarthritis type M17.0 715.96 sulindac (CLINORIL) 200 mg tablet       1. Therapy reviewed. Monitor labs in 2-4 weeks on therapy. 2.  Follow-up BP on all meds (off atenolol currently) reviewed. 3.  Fasting for labs this afternoon--has fasted all day. 4.  Improved from 6 to 5.4 last check--repeat today reviewed. 6.  Refill reviewed. Helps with knee pain and tolerates well PRN. Follow-up Disposition:  Return in about 4 weeks (around 2/5/2018) for Blood Pressure follow-up, non-fasting labs. lab results and schedule of future lab studies reviewed with patient  reviewed diet, exercise and weight control  reviewed medications and side effects in detail    For additional documentation of information and/or recommendations discussed this visit, please see notes in instructions. Plan and evaluation (above) reviewed with pt at visit  Patient voiced understanding of plan and provided with time to ask/review questions. After Visit Summary (AVS) provided to pt after visit with additional instructions as needed/reviewed. no

## 2020-03-19 ENCOUNTER — OFFICE VISIT (OUTPATIENT)
Dept: INTERNAL MEDICINE CLINIC | Age: 60
End: 2020-03-19

## 2020-03-19 VITALS
SYSTOLIC BLOOD PRESSURE: 135 MMHG | WEIGHT: 200.13 LBS | HEART RATE: 61 BPM | RESPIRATION RATE: 14 BRPM | BODY MASS INDEX: 36.83 KG/M2 | DIASTOLIC BLOOD PRESSURE: 85 MMHG | OXYGEN SATURATION: 97 % | TEMPERATURE: 97.6 F | HEIGHT: 62 IN

## 2020-03-19 DIAGNOSIS — E55.9 HYPOVITAMINOSIS D: ICD-10-CM

## 2020-03-19 DIAGNOSIS — M17.0 OSTEOARTHRITIS OF BOTH KNEES, UNSPECIFIED OSTEOARTHRITIS TYPE: ICD-10-CM

## 2020-03-19 DIAGNOSIS — R73.09 ELEVATED HEMOGLOBIN A1C: ICD-10-CM

## 2020-03-19 DIAGNOSIS — I10 ESSENTIAL HYPERTENSION: ICD-10-CM

## 2020-03-19 DIAGNOSIS — E78.00 HYPERCHOLESTEROLEMIA: ICD-10-CM

## 2020-03-19 DIAGNOSIS — E78.00 HYPERCHOLESTEROLEMIA: Primary | ICD-10-CM

## 2020-03-19 RX ORDER — HYDROCHLOROTHIAZIDE 25 MG/1
25 TABLET ORAL DAILY
Qty: 90 TAB | Refills: 1 | Status: SHIPPED | OUTPATIENT
Start: 2020-03-19 | End: 2020-11-16

## 2020-03-19 RX ORDER — AMLODIPINE BESYLATE 5 MG/1
TABLET ORAL
Qty: 90 TAB | Refills: 1 | Status: SHIPPED | OUTPATIENT
Start: 2020-03-19 | End: 2020-11-16

## 2020-03-19 RX ORDER — ATORVASTATIN CALCIUM 20 MG/1
20 TABLET, FILM COATED ORAL DAILY
Qty: 90 TAB | Refills: 1 | Status: CANCELLED | OUTPATIENT
Start: 2020-03-19

## 2020-03-19 RX ORDER — ATENOLOL 100 MG/1
TABLET ORAL
Qty: 90 TAB | Refills: 1 | Status: SHIPPED | OUTPATIENT
Start: 2020-03-19 | End: 2020-11-16

## 2020-03-19 RX ORDER — LOSARTAN POTASSIUM 100 MG/1
TABLET ORAL
Qty: 90 TAB | Refills: 1 | Status: SHIPPED | OUTPATIENT
Start: 2020-03-19 | End: 2020-11-16

## 2020-03-19 NOTE — TELEPHONE ENCOUNTER
Labs updated today. If lipids at goal, plan refill simvastatin as currently taking. If lipids not at goal, plan change to atorvastatin 20mg nightly for 90-day supply. If Vit D low, also plan to send 50K weekly supplement.

## 2020-03-19 NOTE — PROGRESS NOTES
History of Present Illness:   Lucrecia Gordon is a 60 y.o. female here for evaluation:    Chief Complaint   Patient presents with    Blood Pressure Check    Labs     Patient fasting       Notes (nursing/rooming note copied below in italics):  Patient had flu vaccine done through employer Sept 2019. LOV here 3-29-19. Last labs Oct 2018. March 2019 labs not drawn. She is not having any new problems. MSK Pain:  She is having pain with legs with being up all day on her feet. She notes if could work 8-hr instead of 12-hr shifts, pain is less. She has some joint pain with this. She notes pain pre-dated her statin use. Not clinically related to statin currently. We reviewed in light of statin change and last lipids. She is trying not to take any more meds, but reviewed Tylenol PRN with sulindac for leg pain. Cholesterol, total   Date Value Ref Range Status   10/16/2018 263 (H) 100 - 199 mg/dL Final   01/08/2018 278 (H) 100 - 199 mg/dL Final   01/17/2017 229 (H) 100 - 199 mg/dL Final   01/11/2016 214 (H) 100 - 199 mg/dL Final     Triglyceride   Date Value Ref Range Status   10/16/2018 151 (H) 0 - 149 mg/dL Final   01/08/2018 152 (H) 0 - 149 mg/dL Final   01/17/2017 163 (H) 0 - 149 mg/dL Final   01/11/2016 152 (H) 0 - 149 mg/dL Final     HDL Cholesterol   Date Value Ref Range Status   10/16/2018 56 >39 mg/dL Final   01/08/2018 83 >39 mg/dL Final   01/17/2017 59 >39 mg/dL Final   01/11/2016 56 >39 mg/dL Final     Comment:     According to ATP-III Guidelines, HDL-C >59 mg/dL is considered a  negative risk factor for CHD.        LDL, calculated   Date Value Ref Range Status   10/16/2018 177 (H) 0 - 99 mg/dL Final   01/08/2018 165 (H) 0 - 99 mg/dL Final   01/17/2017 137 (H) 0 - 99 mg/dL Final   01/11/2016 128 (H) 0 - 99 mg/dL Final     AST (SGOT)   Date Value Ref Range Status   10/16/2018 19 0 - 40 IU/L Final   01/08/2018 20 0 - 40 IU/L Final   01/17/2017 18 0 - 40 IU/L Final   01/11/2016 19 0 - 40 IU/L Final     ALT (SGPT)   Date Value Ref Range Status   10/16/2018 13 0 - 32 IU/L Final   01/08/2018 11 0 - 32 IU/L Final   01/17/2017 14 0 - 32 IU/L Final   01/11/2016 12 0 - 32 IU/L Final     Creatine Kinase,Total   Date Value Ref Range Status   10/16/2018 171 24 - 173 U/L Final   01/08/2018 148 24 - 173 U/L Final   01/17/2017 164 24 - 173 U/L Final   01/11/2016 196 (H) 24 - 173 U/L Final     Hemoglobin A1c   Date Value Ref Range Status   10/16/2018 5.6 4.8 - 5.6 % Final     Comment:              Prediabetes: 5.7 - 6.4           Diabetes: >6.4           Glycemic control for adults with diabetes: <7.0     01/08/2018 5.7 (H) 4.8 - 5.6 % Final     Comment:              Pre-diabetes: 5.7 - 6.4           Diabetes: >6.4           Glycemic control for adults with diabetes: <7.0     01/17/2017 5.4 4.8 - 5.6 % Final     Comment:              Pre-diabetes: 5.7 - 6.4           Diabetes: >6.4           Glycemic control for adults with diabetes: <7.0     01/11/2016 6.0 (H) 4.8 - 5.6 % Final     Comment:              Pre-diabetes: 5.7 - 6.4           Diabetes: >6.4           Glycemic control for adults with diabetes: <7.0     08/25/2015 6.2 (H) 4.8 - 5.6 % Final     Comment:              Increased risk for diabetes: 5.7 - 6.4           Diabetes: >6.4           Glycemic control for adults with diabetes: <7.0       Estimated average glucose   Date Value Ref Range Status   10/16/2018 114 mg/dL Final   01/08/2018 117 mg/dL Final   01/17/2017 108 mg/dL Final   01/11/2016 126 mg/dL Final     Glucose   Date Value Ref Range Status   10/16/2018 109 (H) 65 - 99 mg/dL Final   01/08/2018 84 65 - 99 mg/dL Final   01/17/2017 91 65 - 99 mg/dL Final   01/11/2016 110 (H) 65 - 99 mg/dL Final   08/25/2015 102 (H) 65 - 99 mg/dL Final         Reviewed at her current simvastatin dose of 20mg, cannot adjust further due to interaction with amlodipine. Reviewed and prefers change as below if needed.   Reviewed atorvastatin and rosuvastatin, and both at same level coverage with electronic formulary decision support as current simvastatin. Additionally reviewed that amlodipine 5mg + atorvastatin 10mg/20mg/40mg/80mg is available as a combination which is listed with electronic formulary decision support as same level coverage as statin alone (all listed as Preferred Level 3). She notes not taking any Vit D supplement now. Plan re-start based on level today. Nursing screenings reviewed by provider at visit. Prior to Admission medications    Medication Sig Start Date End Date Taking? Authorizing Provider   losartan (COZAAR) 100 mg tablet TAKE 1 TABLET DAILY 2/9/20  Yes Cameron Thomason MD   simvastatin (ZOCOR) 20 mg tablet TAKE 1 TABLET NIGHTLY 1/17/20  Yes Cameron Thomason MD   hydroCHLOROthiazide (HYDRODIURIL) 25 mg tablet TAKE 1 TABLET DAILY 1/17/20  Yes Cameron Thomason MD   amLODIPine (NORVASC) 5 mg tablet TAKE 1 TABLET ONCE DAILY   WITH BLOOD PRESSURE        MEDICATIONS NIGHTLY 9/15/19  Yes Cameron Thomason MD   atenolol (TENORMIN) 100 mg tablet TAKE 1 TABLET DAILY 9/15/19  Yes Cameron Thomason MD   sulindac (CLINORIL) 200 mg tablet Take 1 Tab by mouth two (2) times daily as needed for Pain. Take instead of BC powder or other NSAIDs (ibuprofen, naprosyn). 7/2/18  Yes Cameron Thomason MD   cetirizine (ZYRTEC) 10 mg tablet Take 1 Tab by mouth daily. 1/17/17  Yes Cameron Thomason MD   cholecalciferol, vitamin d3, 10,000 unit cap Take 2 Caps by mouth every seven (7) days. Start once complete 15 weeks of the 50,000 vitamin D supplement (ergocalciferol). 6/27/19   Cameron Thomason MD        ROS    Vitals:    03/19/20 1012   BP: 135/85   Pulse: 61   Resp: 14   Temp: 97.6 °F (36.4 °C)   TempSrc: Oral   SpO2: 97%   Weight: 200 lb 2 oz (90.8 kg)   Height: 5' 2\" (1.575 m)   PainSc:   0 - No pain      Body mass index is 36.6 kg/m². Physical Exam:     Physical Exam  Vitals signs and nursing note reviewed. Constitutional:       General: She is not in acute distress. Appearance: Normal appearance. She is well-developed. She is not diaphoretic. HENT:      Head: Normocephalic and atraumatic. Mouth/Throat:      Mouth: Mucous membranes are moist.   Eyes:      General: No scleral icterus. Right eye: No discharge. Left eye: No discharge. Conjunctiva/sclera: Conjunctivae normal.   Cardiovascular:      Rate and Rhythm: Normal rate and regular rhythm. Pulses: Normal pulses. Heart sounds: Normal heart sounds. No murmur. No friction rub. No gallop. Pulmonary:      Effort: Pulmonary effort is normal. No respiratory distress. Breath sounds: Normal breath sounds. No stridor. No wheezing or rhonchi. Abdominal:      General: Bowel sounds are normal.      Palpations: Abdomen is soft. Tenderness: There is no abdominal tenderness. Musculoskeletal:         General: No swelling, tenderness, deformity or signs of injury. Right lower leg: No edema. Left lower leg: No edema. Skin:     General: Skin is warm. Coloration: Skin is not jaundiced or pale. Findings: No bruising, erythema or rash. Neurological:      General: No focal deficit present. Mental Status: She is alert. Motor: No abnormal muscle tone. Coordination: Coordination normal.      Gait: Gait normal.   Psychiatric:         Mood and Affect: Mood normal.         Behavior: Behavior normal.         Thought Content: Thought content normal.         Judgment: Judgment normal.         Assessment and Plan:       ICD-10-CM ICD-9-CM    1. Hypercholesterolemia E78.00 272.0 CBC WITH AUTOMATED DIFF      HEPATIC FUNCTION PANEL      LIPID PANEL      CK   2. Essential hypertension I10 401.9 CBC WITH AUTOMATED DIFF      RENAL FUNCTION PANEL      losartan (COZAAR) 100 mg tablet      hydroCHLOROthiazide (HYDRODIURIL) 25 mg tablet      atenoloL (TENORMIN) 100 mg tablet   3.  Elevated hemoglobin A1c R73.09 790.29 CBC WITH AUTOMATED DIFF      HEMOGLOBIN A1C WITH EAG   4. Hypovitaminosis D E55.9 268.9 VITAMIN D, 25 HYDROXY   5. Osteoarthritis of both knees, unspecified osteoarthritis type M17.0 715.96        1. Fasting labs reviewed at visit. Plan change to atorvastatin once result reviewed at visit, and as per instructions. See telephone/refill encounter after visit. 2.  Repeat labs and refills reviewed. 3.  Monitoring reviewed. 4.  Repletion reviewed PRN once labs result. 5.  Letter written for work for 8-hr shifts as reviewed. Follow-up and Dispositions    · Return in about 3 months (around 6/19/2020) for medication follow-up, Blood Pressure follow-up, fasting labs. lab results and schedule of future lab studies reviewed with patient  reviewed medications and side effects in detail    For additional documentation of information and/or recommendations discussed this visit, please see notes in instructions. Plan and evaluation (above) reviewed with pt at visit  Patient voiced understanding of plan and provided with time to ask/review questions. After Visit Summary (AVS) provided to pt after visit with additional instructions as needed/reviewed. Future Appointments   Date Time Provider Williams Barone   6/19/2020  8:00 AM David Rosario NP [address] [facility]   --Updated future visits after patient check-out.

## 2020-03-19 NOTE — PATIENT INSTRUCTIONS
1.  Once your labs result, if cholesterol values are at goal/normal, will refill current simvastatin 20mg nightly. If cholesterol values not at goal, will change to atorvastatin 20mg nightly instead. If problems with cost of alternative, please let us know, or use GoodRx to help lower cost.      2.  Based on Vitamin D level, will refill weekly supplement as reviewed.

## 2020-03-19 NOTE — PROGRESS NOTES
RM 17    Patient had flu vaccine done through employer Sept 2019. Chief Complaint   Patient presents with    Blood Pressure Check    Labs     Patient fasting       1. Have you been to the ER, urgent care clinic since your last visit? Hospitalized since your last visit? No    2. Have you seen or consulted any other health care providers outside of the [address] [place] [address] since your last visit? Include any pap smears or colon screening. No     Health Maintenance Due   Topic Date Due    Hepatitis C Screening  1960    Shingrix Vaccine Age 50> (1 of 2) 10/09/2009    PAP AKA CERVICAL CYTOLOGY  05/30/2016    Influenza Age 9 to Adult  08/01/2019    A1C test (Diabetic or Prediabetic)  10/16/2019    Lipid Screen  10/16/2019    Colonoscopy  08/24/2020       Abuse Screening Questionnaire 3/19/2020   Do you ever feel afraid of your partner? N   Are you in a relationship with someone who physically or mentally threatens you? N   Is it safe for you to go home?  Y       3 most recent PHQ Screens 3/19/2020   Little interest or pleasure in doing things Not at all   Feeling down, depressed, irritable, or hopeless Not at all   Total Score PHQ 2 0       Learning Assessment 3/29/2019   PRIMARY LEARNER Patient   HIGHEST LEVEL OF EDUCATION - PRIMARY LEARNER  -   BARRIERS PRIMARY LEARNER NONE   CO-LEARNER CAREGIVER -   PRIMARY LANGUAGE ENGLISH   LEARNER PREFERENCE PRIMARY READING     PICTURES   ANSWERED BY patient   RELATIONSHIP SELF

## 2020-03-19 NOTE — LETTER
NOTIFICATION RETURN TO WORK / SCHOOL 
 
3/19/2020 10:58 AM 
 
Ms. [address] 
[address] [phone] To Whom It May Concern: 
 
Delgado Combs is currently under the care of Markie. Please allow her to work 8-hour shifts instead of 12-hour shifts. She has chronic musculoskeletal problems which are worsened when she works longer shifts, due to prolonged stanging related to her work. If there are questions or concerns please have the patient contact our office.  
 
 
Sincerely, 
 
Remington Hollis MD

## 2020-03-20 LAB
25(OH)D3+25(OH)D2 SERPL-MCNC: 15.3 NG/ML (ref 30–100)
ALBUMIN SERPL-MCNC: 4.3 G/DL (ref 3.8–4.9)
ALP SERPL-CCNC: 77 IU/L (ref 39–117)
ALT SERPL-CCNC: 14 IU/L (ref 0–32)
AST SERPL-CCNC: 19 IU/L (ref 0–40)
BASOPHILS # BLD AUTO: 0 X10E3/UL (ref 0–0.2)
BASOPHILS NFR BLD AUTO: 0 %
BILIRUB DIRECT SERPL-MCNC: 0.15 MG/DL (ref 0–0.4)
BILIRUB SERPL-MCNC: 0.5 MG/DL (ref 0–1.2)
BUN SERPL-MCNC: 22 MG/DL (ref 8–27)
BUN/CREAT SERPL: 18 (ref 12–28)
CALCIUM SERPL-MCNC: 9.6 MG/DL (ref 8.7–10.3)
CHLORIDE SERPL-SCNC: 101 MMOL/L (ref 96–106)
CHOLEST SERPL-MCNC: 235 MG/DL (ref 100–199)
CK SERPL-CCNC: 314 U/L (ref 24–173)
CO2 SERPL-SCNC: 23 MMOL/L (ref 20–29)
CREAT SERPL-MCNC: 1.24 MG/DL (ref 0.57–1)
EOSINOPHIL # BLD AUTO: 0.2 X10E3/UL (ref 0–0.4)
EOSINOPHIL NFR BLD AUTO: 3 %
ERYTHROCYTE [DISTWIDTH] IN BLOOD BY AUTOMATED COUNT: 11.9 % (ref 11.7–15.4)
EST. AVERAGE GLUCOSE BLD GHB EST-MCNC: 123 MG/DL
GLUCOSE SERPL-MCNC: 106 MG/DL (ref 65–99)
HBA1C MFR BLD: 5.9 % (ref 4.8–5.6)
HCT VFR BLD AUTO: 37 % (ref 34–46.6)
HDLC SERPL-MCNC: 68 MG/DL
HGB BLD-MCNC: 12.9 G/DL (ref 11.1–15.9)
IMM GRANULOCYTES # BLD AUTO: 0 X10E3/UL (ref 0–0.1)
IMM GRANULOCYTES NFR BLD AUTO: 0 %
LDLC SERPL CALC-MCNC: 140 MG/DL (ref 0–99)
LYMPHOCYTES # BLD AUTO: 1.8 X10E3/UL (ref 0.7–3.1)
LYMPHOCYTES NFR BLD AUTO: 29 %
MCH RBC QN AUTO: 32.4 PG (ref 26.6–33)
MCHC RBC AUTO-ENTMCNC: 34.9 G/DL (ref 31.5–35.7)
MCV RBC AUTO: 93 FL (ref 79–97)
MONOCYTES # BLD AUTO: 0.4 X10E3/UL (ref 0.1–0.9)
MONOCYTES NFR BLD AUTO: 6 %
NEUTROPHILS # BLD AUTO: 4 X10E3/UL (ref 1.4–7)
NEUTROPHILS NFR BLD AUTO: 62 %
PHOSPHATE SERPL-MCNC: 3.3 MG/DL (ref 3–4.3)
PLATELET # BLD AUTO: 276 X10E3/UL (ref 150–450)
POTASSIUM SERPL-SCNC: 4.1 MMOL/L (ref 3.5–5.2)
PROT SERPL-MCNC: 7.3 G/DL (ref 6–8.5)
RBC # BLD AUTO: 3.98 X10E6/UL (ref 3.77–5.28)
SODIUM SERPL-SCNC: 138 MMOL/L (ref 134–144)
TRIGL SERPL-MCNC: 135 MG/DL (ref 0–149)
VLDLC SERPL CALC-MCNC: 27 MG/DL (ref 5–40)
WBC # BLD AUTO: 6.4 X10E3/UL (ref 3.4–10.8)

## 2020-03-24 RX ORDER — ERGOCALCIFEROL 1.25 MG/1
50000 CAPSULE ORAL
Qty: 15 CAP | Refills: 0 | Status: SHIPPED | OUTPATIENT
Start: 2020-03-24 | End: 2020-07-01

## 2020-03-24 RX ORDER — SIMVASTATIN 20 MG/1
TABLET, FILM COATED ORAL
Qty: 90 TAB | Refills: 1 | Status: SHIPPED | OUTPATIENT
Start: 2020-03-24 | End: 2020-11-16

## 2020-03-24 NOTE — TELEPHONE ENCOUNTER
MyChart message to pt--with lab results. Continue same simvastatin at same dose--lipids not at goal, but mild elevation CK. Work on diet and exercise for elevation in A1c. Vit D sent to pharmacy as reviewed, based on level.

## 2020-04-16 ENCOUNTER — DOCUMENTATION ONLY (OUTPATIENT)
Dept: INTERNAL MEDICINE CLINIC | Age: 60
End: 2020-04-16

## 2020-07-08 ENCOUNTER — TELEPHONE (OUTPATIENT)
Dept: INTERNAL MEDICINE CLINIC | Age: 60
End: 2020-07-08

## 2020-07-09 ENCOUNTER — OFFICE VISIT (OUTPATIENT)
Dept: INTERNAL MEDICINE CLINIC | Age: 60
End: 2020-07-09

## 2020-07-09 VITALS
BODY MASS INDEX: 37.38 KG/M2 | HEART RATE: 56 BPM | DIASTOLIC BLOOD PRESSURE: 73 MMHG | HEIGHT: 62 IN | OXYGEN SATURATION: 96 % | SYSTOLIC BLOOD PRESSURE: 115 MMHG | RESPIRATION RATE: 16 BRPM | TEMPERATURE: 97.7 F | WEIGHT: 203.13 LBS

## 2020-07-09 DIAGNOSIS — Z01.818 PREOP EXAMINATION: ICD-10-CM

## 2020-07-09 DIAGNOSIS — I10 ESSENTIAL HYPERTENSION: Primary | ICD-10-CM

## 2020-07-09 DIAGNOSIS — E78.00 HYPERCHOLESTEROLEMIA: ICD-10-CM

## 2020-07-09 DIAGNOSIS — N62 MACROMASTIA: ICD-10-CM

## 2020-07-09 DIAGNOSIS — M17.0 OSTEOARTHRITIS OF BOTH KNEES, UNSPECIFIED OSTEOARTHRITIS TYPE: ICD-10-CM

## 2020-07-09 DIAGNOSIS — Z23 ENCOUNTER FOR IMMUNIZATION: ICD-10-CM
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
## 2020-07-09 NOTE — PATIENT INSTRUCTIONS
Please have the pre-op records (labs, imaging, EKG) faxed to [phone], as reviewed/requested. Will fax pre-op form and attached letter/labs to provider as reviewed (and wIll confirm fax received). If you need re-faxed, please let us know.

## 2020-07-09 NOTE — PROGRESS NOTES
RM 16    Chief Complaint   Patient presents with    Medication Evaluation     3 month follow up    Blood Pressure Check    Labs     Patient fasting.  Pre-op Exam     Breast reduction scheduled for 7/30/20     1. Have you been to the ER, urgent care clinic since your last visit? Hospitalized since your last visit? No    2. Have you seen or consulted any other health care providers outside of the [address] [place] [address] since your last visit? Include any pap smears or colon screening. No    Health Maintenance Due   Topic Date Due    Hepatitis C Screening  1960    Shingrix Vaccine Age 50> (1 of 2) 10/09/2009    PAP AKA CERVICAL CYTOLOGY  05/30/2016    Colonoscopy  08/24/2020     Abuse Screening Questionnaire 7/9/2020   Do you ever feel afraid of your partner? N   Are you in a relationship with someone who physically or mentally threatens you? N   Is it safe for you to go home? Y     3 most recent PHQ Screens 7/9/2020   Little interest or pleasure in doing things Not at all   Feeling down, depressed, irritable, or hopeless Not at all   Total Score PHQ 2 0     Learning Assessment 7/9/2020   PRIMARY LEARNER Patient   HIGHEST LEVEL OF EDUCATION - PRIMARY LEARNER  -   BARRIERS PRIMARY LEARNER [address] CARE[employer] -   PRIMARY LANGUAGE ENGLISH   LEARNER PREFERENCE PRIMARY DEMONSTRATION     -   ANSWERED BY patient   RELATIONSHIP SELF     Immunization administered to right deltoid 7/9/2020 by Rory Haywood LPN with patient's consent. Patient tolerated procedure well. No reactions noted. VIS provided.

## 2020-11-11 DIAGNOSIS — E78.00 HYPERCHOLESTEROLEMIA: ICD-10-CM

## 2020-11-11 DIAGNOSIS — I10 ESSENTIAL HYPERTENSION: ICD-10-CM

## 2020-11-12 DIAGNOSIS — I10 ESSENTIAL HYPERTENSION: ICD-10-CM

## 2020-11-12 DIAGNOSIS — E78.00 HYPERCHOLESTEROLEMIA: ICD-10-CM

## 2020-11-12 DIAGNOSIS — M17.0 OSTEOARTHRITIS OF BOTH KNEES, UNSPECIFIED OSTEOARTHRITIS TYPE: ICD-10-CM

## 2020-11-12 RX ORDER — AMLODIPINE BESYLATE 5 MG/1
TABLET ORAL
Qty: 90 TAB | Refills: 0 | Status: CANCELLED | OUTPATIENT
Start: 2020-11-12

## 2020-11-12 RX ORDER — LOSARTAN POTASSIUM 100 MG/1
100 TABLET ORAL DAILY
Qty: 90 TAB | Refills: 0 | Status: CANCELLED | OUTPATIENT
Start: 2020-11-12

## 2020-11-12 RX ORDER — ATENOLOL 100 MG/1
100 TABLET ORAL DAILY
Qty: 90 TAB | Refills: 0 | Status: CANCELLED | OUTPATIENT
Start: 2020-11-12

## 2020-11-12 RX ORDER — HYDROCHLOROTHIAZIDE 25 MG/1
25 TABLET ORAL DAILY
Qty: 90 TAB | Refills: 0 | Status: CANCELLED | OUTPATIENT
Start: 2020-11-12

## 2020-11-12 RX ORDER — SIMVASTATIN 20 MG/1
20 TABLET, FILM COATED ORAL
Qty: 90 TAB | Refills: 0 | Status: CANCELLED | OUTPATIENT
Start: 2020-11-12

## 2020-11-12 NOTE — TELEPHONE ENCOUNTER
Writer contacted patient via [facility] regarding scheduling an office visit. Last visit 07/09/2020 MD Haylee Ortiz   Next appointment 3 months (10/2020) - Nothing scheduled   Previous refill encounter(s)   07/02/2018 Clinoril #150 with 1 refill,  03/19/2020:  - Tenormin #90 with 1 refill,  - Norvasc #90 with 1 refill,  - HCTZ #90 with 1 refill,  - Cozaar #90 with 1 refill,  03/24/2020 Zocor #90 with 1 refill. Requested Prescriptions     Pending Prescriptions Disp Refills    sulindac (CLINORIL) 200 mg tablet 150 Tab 1     Sig: Take 1 Tab by mouth two (2) times daily as needed for Pain. Take instead of BC powder or other NSAIDs (ibuprofen, naprosyn).  amLODIPine (NORVASC) 5 mg tablet 90 Tab 1     Sig: Take 1 tab by mouth once daily with blood pressure medications nightly    atenoloL (TENORMIN) 100 mg tablet 90 Tab 1     Sig: Take 1 Tab by mouth daily.  hydroCHLOROthiazide (HYDRODIURIL) 25 mg tablet 90 Tab 1     Sig: Take 1 Tab by mouth daily.  losartan (COZAAR) 100 mg tablet 90 Tab 1     Sig: Take 1 Tab by mouth daily.  simvastatin (ZOCOR) 20 mg tablet 90 Tab 1     Sig: Take 1 Tab by mouth nightly.

## 2020-11-16 RX ORDER — SULINDAC 200 MG/1
200 TABLET ORAL
Qty: 150 TAB | Refills: 1 | Status: SHIPPED | OUTPATIENT
Start: 2020-11-16 | End: 2021-03-29 | Stop reason: SDUPTHER

## 2020-11-16 RX ORDER — HYDROCHLOROTHIAZIDE 25 MG/1
TABLET ORAL
Qty: 90 TAB | Refills: 1 | Status: SHIPPED | OUTPATIENT
Start: 2020-11-16 | End: 2021-06-09 | Stop reason: SDUPTHER

## 2020-11-16 RX ORDER — LOSARTAN POTASSIUM 100 MG/1
TABLET ORAL
Qty: 90 TAB | Refills: 1 | Status: SHIPPED | OUTPATIENT
Start: 2020-11-16 | End: 2021-06-09 | Stop reason: SDUPTHER

## 2020-11-16 RX ORDER — SIMVASTATIN 20 MG/1
TABLET, FILM COATED ORAL
Qty: 90 TAB | Refills: 1 | Status: SHIPPED | OUTPATIENT
Start: 2020-11-16 | End: 2021-06-09 | Stop reason: SDUPTHER

## 2020-11-16 RX ORDER — AMLODIPINE BESYLATE 5 MG/1
TABLET ORAL
Qty: 90 TAB | Refills: 1 | Status: SHIPPED | OUTPATIENT
Start: 2020-11-16 | End: 2021-06-09 | Stop reason: SDUPTHER

## 2020-11-16 RX ORDER — ATENOLOL 100 MG/1
TABLET ORAL
Qty: 90 TAB | Refills: 1 | Status: SHIPPED | OUTPATIENT
Start: 2020-11-16 | End: 2021-06-09 | Stop reason: SDUPTHER

## 2020-11-16 NOTE — TELEPHONE ENCOUNTER
Refill request(s) approved--sulindac--last refill July 2018. Duplicate requests--simvastatin, HCTZ, losartan, atenolol, amlodipine refilled today.

## 2020-11-17 NOTE — TELEPHONE ENCOUNTER
A fax from [facility] indicates the prescription for Sulindac requires clarification. This medication generated a drug-drug interaction as it interacts with the patient's HCTZ. Concurrent use of HCTZ and NSAIDs may cause renal insufficiency and decreased diuretic efficacy. Should patient remain on both medications? Yes or No. Please advise.      KonkuraNorman   [phone]  Reference#: [id number]

## 2020-11-19 ENCOUNTER — TELEPHONE (OUTPATIENT)
Dept: INTERNAL MEDICINE CLINIC | Age: 60
End: 2020-11-19

## 2020-11-19 NOTE — TELEPHONE ENCOUNTER
Pharmacist reports Drug interaction issue with Sulidac and HCTZ. Reports causes renal insuffiencey. Sulidac prescription on hold until clarified by provider. Ca;; back number [phone]. Ref# [id number].

## 2020-12-04 NOTE — TELEPHONE ENCOUNTER
2 weeks ago          Pharmacist reports Drug interaction issue with Sulidac and HCTZ. Reports causes renal insuffiencey. Sulidac prescription on hold until clarified by provider. Ca;; back number [phone]. Ref# [id number].

## 2021-01-04 NOTE — TELEPHONE ENCOUNTER
----- Message from Sarah Aquino sent at 12/3/2020  3:50 PM EST -----  Regarding: WILLI/MD/TELEPHONE  Contact: [phone]  Medication Refill    Caller (if not patient): N/A      Relationship of caller (if not patient): N/A      Best contact number(s): [phone]      Name of medication and dosage if known: Sulindac      Is patient out of this medication (yes/no): Yes      Pharmacy name: [facility] mail order    Pharmacy listed in chart? (yes/no):Yes    Pharmacy phone number: N/A      Details to clarify the request: Refill request for Sulindac      Sarah Aquino Left message to return call with Nevin at [facility].

## 2021-03-24 ENCOUNTER — TRANSCRIBE ORDER (OUTPATIENT)
Dept: REGISTRATION | Age: 61
End: 2021-03-24

## 2021-03-24 DIAGNOSIS — Z12.31 VISIT FOR SCREENING MAMMOGRAM: Primary | ICD-10-CM

## 2021-03-26 ENCOUNTER — HOSPITAL ENCOUNTER (OUTPATIENT)
Dept: MAMMOGRAPHY | Age: 61
Discharge: HOME | End: 2021-03-26
Payer: COMMERCIAL

## 2021-03-26 DIAGNOSIS — Z12.31 VISIT FOR SCREENING MAMMOGRAM: ICD-10-CM

## 2021-03-26 PROCEDURE — 77067 SCR MAMMO BI INCL CAD: CPT

## 2021-03-29 DIAGNOSIS — M17.0 OSTEOARTHRITIS OF BOTH KNEES, UNSPECIFIED OSTEOARTHRITIS TYPE: ICD-10-CM

## 2021-04-01 NOTE — TELEPHONE ENCOUNTER
Last visit 07/09/2020 MD Angela Richardson   Next appointment Pt canceled 03/31/2021 - Nothing scheduled   Previous refill encounter(s)   11/16/2020 Clinoril #150 with 1 refill     Requested Prescriptions     Pending Prescriptions Disp Refills    sulindac (CLINORIL) 200 mg tablet 60 Tab 0     Sig: Take 1 Tab by mouth two (2) times daily as needed for Pain. Take instead of BC powder or other NSAIDs (ibuprofen, naprosyn).

## 2021-04-08 RX ORDER — SULINDAC 200 MG/1
200 TABLET ORAL
Qty: 60 TAB | Refills: 0 | Status: SHIPPED | OUTPATIENT
Start: 2021-04-08 | End: 2021-06-09 | Stop reason: SDUPTHER

## 2021-04-09 NOTE — TELEPHONE ENCOUNTER
Refill request(s) approved--sulindac--#60 with no refills--for PRN use. Refill protocol details (computer-generated) reviewed, as available. [employer] message to pt--to schedule follow-up appt.

## 2021-06-09 ENCOUNTER — OFFICE VISIT (OUTPATIENT)
Dept: INTERNAL MEDICINE CLINIC | Age: 61
End: 2021-06-09
Payer: COMMERCIAL

## 2021-06-09 VITALS
HEIGHT: 62 IN | RESPIRATION RATE: 14 BRPM | OXYGEN SATURATION: 95 % | DIASTOLIC BLOOD PRESSURE: 84 MMHG | HEART RATE: 56 BPM | BODY MASS INDEX: 34.99 KG/M2 | SYSTOLIC BLOOD PRESSURE: 130 MMHG | WEIGHT: 190.13 LBS | TEMPERATURE: 98.2 F

## 2021-06-09 DIAGNOSIS — Z11.59 NEED FOR HEPATITIS C SCREENING TEST: ICD-10-CM

## 2021-06-09 DIAGNOSIS — I10 ESSENTIAL HYPERTENSION: ICD-10-CM

## 2021-06-09 DIAGNOSIS — M17.0 OSTEOARTHRITIS OF BOTH KNEES, UNSPECIFIED OSTEOARTHRITIS TYPE: ICD-10-CM

## 2021-06-09 DIAGNOSIS — E55.9 HYPOVITAMINOSIS D: ICD-10-CM

## 2021-06-09 DIAGNOSIS — Z11.4 SCREENING FOR HIV WITHOUT PRESENCE OF RISK FACTORS: ICD-10-CM

## 2021-06-09 DIAGNOSIS — Z00.00 WELL WOMAN EXAM (NO GYNECOLOGICAL EXAM): Primary | ICD-10-CM

## 2021-06-09 DIAGNOSIS — E78.00 HYPERCHOLESTEROLEMIA: ICD-10-CM

## 2021-06-09 DIAGNOSIS — Z12.11 SCREENING FOR MALIGNANT NEOPLASM OF COLON: ICD-10-CM

## 2021-06-09 DIAGNOSIS — R73.09 ELEVATED HEMOGLOBIN A1C: ICD-10-CM

## 2021-06-09 DIAGNOSIS — Z23 ENCOUNTER FOR IMMUNIZATION: ICD-10-CM

## 2021-06-09 LAB
25(OH)D3 SERPL-MCNC: 20.7 NG/ML (ref 30–100)
ALBUMIN SERPL-MCNC: 3.6 G/DL (ref 3.5–5)
ALBUMIN SERPL-MCNC: 3.6 G/DL (ref 3.5–5)
ALBUMIN/GLOB SERPL: 0.9 {RATIO} (ref 1.1–2.2)
ALP SERPL-CCNC: 86 U/L (ref 45–117)
ALT SERPL-CCNC: 15 U/L (ref 12–78)
ANION GAP SERPL CALC-SCNC: 8 MMOL/L (ref 5–15)
AST SERPL-CCNC: 17 U/L (ref 15–37)
BASOPHILS # BLD: 0 K/UL (ref 0–0.1)
BASOPHILS NFR BLD: 1 % (ref 0–1)
BILIRUB DIRECT SERPL-MCNC: 0.1 MG/DL (ref 0–0.2)
BILIRUB SERPL-MCNC: 0.5 MG/DL (ref 0.2–1)
BUN SERPL-MCNC: 20 MG/DL (ref 6–20)
BUN/CREAT SERPL: 17 (ref 12–20)
CALCIUM SERPL-MCNC: 9.4 MG/DL (ref 8.5–10.1)
CHLORIDE SERPL-SCNC: 106 MMOL/L (ref 97–108)
CHOLEST SERPL-MCNC: 268 MG/DL
CK SERPL-CCNC: 117 U/L (ref 26–192)
CO2 SERPL-SCNC: 25 MMOL/L (ref 21–32)
CREAT SERPL-MCNC: 1.15 MG/DL (ref 0.55–1.02)
DIFFERENTIAL METHOD BLD: ABNORMAL
EOSINOPHIL # BLD: 0.2 K/UL (ref 0–0.4)
EOSINOPHIL NFR BLD: 3 % (ref 0–7)
ERYTHROCYTE [DISTWIDTH] IN BLOOD BY AUTOMATED COUNT: 13 % (ref 11.5–14.5)
EST. AVERAGE GLUCOSE BLD GHB EST-MCNC: 114 MG/DL
GLOBULIN SER CALC-MCNC: 3.9 G/DL (ref 2–4)
GLUCOSE SERPL-MCNC: 106 MG/DL (ref 65–100)
HBA1C MFR BLD: 5.6 % (ref 4–5.6)
HCT VFR BLD AUTO: 36 % (ref 35–47)
HCV AB SERPL QL IA: NONREACTIVE
HCV COMMENT,HCGAC: NORMAL
HDLC SERPL-MCNC: 60 MG/DL
HDLC SERPL: 4.5 {RATIO} (ref 0–5)
HGB BLD-MCNC: 11.7 G/DL (ref 11.5–16)
HIV 1+2 AB+HIV1 P24 AG SERPL QL IA: NONREACTIVE
HIV12 RESULT COMMENT, HHIVC: NORMAL
IMM GRANULOCYTES # BLD AUTO: 0 K/UL (ref 0–0.04)
IMM GRANULOCYTES NFR BLD AUTO: 0 % (ref 0–0.5)
LDLC SERPL CALC-MCNC: 168.2 MG/DL (ref 0–100)
LYMPHOCYTES # BLD: 1.8 K/UL (ref 0.8–3.5)
LYMPHOCYTES NFR BLD: 28 % (ref 12–49)
MCH RBC QN AUTO: 32.3 PG (ref 26–34)
MCHC RBC AUTO-ENTMCNC: 32.5 G/DL (ref 30–36.5)
MCV RBC AUTO: 99.4 FL (ref 80–99)
MONOCYTES # BLD: 0.4 K/UL (ref 0–1)
MONOCYTES NFR BLD: 7 % (ref 5–13)
NEUTS SEG # BLD: 3.9 K/UL (ref 1.8–8)
NEUTS SEG NFR BLD: 61 % (ref 32–75)
NRBC # BLD: 0 K/UL (ref 0–0.01)
NRBC BLD-RTO: 0 PER 100 WBC
PHOSPHATE SERPL-MCNC: 3.7 MG/DL (ref 2.6–4.7)
PLATELET # BLD AUTO: 258 K/UL (ref 150–400)
PMV BLD AUTO: 9.9 FL (ref 8.9–12.9)
POTASSIUM SERPL-SCNC: 4.1 MMOL/L (ref 3.5–5.1)
PROT SERPL-MCNC: 7.5 G/DL (ref 6.4–8.2)
RBC # BLD AUTO: 3.62 M/UL (ref 3.8–5.2)
SODIUM SERPL-SCNC: 139 MMOL/L (ref 136–145)
TRIGL SERPL-MCNC: 199 MG/DL (ref ?–150)
VLDLC SERPL CALC-MCNC: 39.8 MG/DL
WBC # BLD AUTO: 6.4 K/UL (ref 3.6–11)

## 2021-06-09 PROCEDURE — 99396 PREV VISIT EST AGE 40-64: CPT | Performed by: INTERNAL MEDICINE

## 2021-06-09 PROCEDURE — 90471 IMMUNIZATION ADMIN: CPT | Performed by: INTERNAL MEDICINE

## 2021-06-09 PROCEDURE — 90750 HZV VACC RECOMBINANT IM: CPT | Performed by: INTERNAL MEDICINE

## 2021-06-09 PROCEDURE — 99214 OFFICE O/P EST MOD 30 MIN: CPT | Performed by: INTERNAL MEDICINE

## 2021-06-09 RX ORDER — LOSARTAN POTASSIUM 100 MG/1
100 TABLET ORAL DAILY
Qty: 90 TABLET | Refills: 1 | Status: SHIPPED | OUTPATIENT
Start: 2021-06-09 | End: 2022-04-28 | Stop reason: SDUPTHER

## 2021-06-09 RX ORDER — AMLODIPINE BESYLATE 5 MG/1
5 TABLET ORAL DAILY
Qty: 90 TABLET | Refills: 1 | Status: SHIPPED | OUTPATIENT
Start: 2021-06-09 | End: 2022-04-28 | Stop reason: SDUPTHER

## 2021-06-09 RX ORDER — ATENOLOL 100 MG/1
100 TABLET ORAL DAILY
Qty: 90 TABLET | Refills: 1 | Status: SHIPPED | OUTPATIENT
Start: 2021-06-09 | End: 2022-04-28 | Stop reason: SDUPTHER

## 2021-06-09 RX ORDER — HYDROCHLOROTHIAZIDE 25 MG/1
TABLET ORAL
Qty: 90 TABLET | Refills: 1 | Status: SHIPPED | OUTPATIENT
Start: 2021-06-09 | End: 2022-04-28 | Stop reason: SDUPTHER

## 2021-06-09 RX ORDER — SULINDAC 200 MG/1
200 TABLET ORAL
Qty: 60 TABLET | Refills: 2 | Status: SHIPPED | OUTPATIENT
Start: 2021-06-09 | End: 2022-04-28 | Stop reason: SDUPTHER

## 2021-06-09 RX ORDER — SIMVASTATIN 20 MG/1
20 TABLET, FILM COATED ORAL
Qty: 90 TABLET | Refills: 1 | Status: SHIPPED | OUTPATIENT
Start: 2021-06-09 | End: 2022-04-28 | Stop reason: ALTCHOICE

## 2021-06-09 NOTE — PROGRESS NOTES
Luiz Moeller (: 1960) is a 63 y.o. female, established patient, here for evaluation of the following chief complaint(s):  Chief Complaint   Patient presents with    Complete Physical       Assessment and Plan:       ICD-10-CM ICD-9-CM    1. Well woman exam (no gynecological exam)  Z00.00 V70.0 CBC WITH AUTOMATED DIFF      RENAL FUNCTION PANEL      HEPATIC FUNCTION PANEL      HEMOGLOBIN A1C WITH EAG      LIPID PANEL      CK      REFERRAL TO OBSTETRICS AND GYNECOLOGY      CK      LIPID PANEL      HEMOGLOBIN A1C WITH EAG      HEPATIC FUNCTION PANEL      RENAL FUNCTION PANEL      CBC WITH AUTOMATED DIFF   2. Essential hypertension  I10 401.9 atenoloL (TENORMIN) 100 mg tablet      losartan (COZAAR) 100 mg tablet      hydroCHLOROthiazide (HYDRODIURIL) 25 mg tablet      amLODIPine (NORVASC) 5 mg tablet   3. Hypercholesterolemia  E78.00 272.0 HEPATIC FUNCTION PANEL      LIPID PANEL      CK      simvastatin (ZOCOR) 20 mg tablet      CK      LIPID PANEL      HEPATIC FUNCTION PANEL   4. Elevated hemoglobin A1c  R73.09 790.29 RENAL FUNCTION PANEL      HEMOGLOBIN A1C WITH EAG      HEMOGLOBIN A1C WITH EAG      RENAL FUNCTION PANEL   5. Hypovitaminosis D  E55.9 268.9 CANCELED: VITAMIN D, 25 HYDROXY      CANCELED: VITAMIN D, 25 HYDROXY   6. Screening for HIV without presence of risk factors  Z11.4 V73.89 HIV 1/2 AG/AB, 4TH GENERATION,W RFLX CONFIRM      HIV 1/2 AG/AB, 4TH GENERATION,W RFLX CONFIRM   7. Need for hepatitis C screening test  Z11.59 V73.89 HEPATITIS C AB      HEPATITIS C AB   8. Screening for malignant neoplasm of colon  Z12.11 V76.51 REFERRAL TO GASTROENTEROLOGY   9. Encounter for immunization  Z23 V03.89 ZOSTER VACC RECOMBINANT ADJUVANTED   10. Osteoarthritis of both knees, unspecified osteoarthritis type  M17.0 715.96 sulindac (CLINORIL) 200 mg tablet      REFERRAL TO ORTHOPEDICS       Diagnoses #1,6-9 for Preventive Care/Wellness visit.     Due to significant separate problems unrelated to preventive care needs, also addressed following diagnoses/problems at visit as below (diagnoses #2-5, 10 above). 1-6-8:  Screenings reviewed at visit. Referral to gyn to update screenings reviewed. Referral for colonoscopy reviewed at visit. 2-5:  Updated labs reviewed at visit. 9.  Completing series reviewed. 10.  Referral(s) and referral coordination reviewed with patient at visit. Medication(s), management and follow-up based on response reviewed at visit. Follow-up and Dispositions    · Return in about 6 months (around 12/9/2021), or if symptoms worsen or fail to improve, for Blood Pressure follow-up, fasting labs, referral follow-up.       lab results and schedule of future lab studies reviewed with patient  reviewed medications and side effects in detail    For additional documentation of information and/or recommendations discussed this visit, please see notes in instructions. Plan and evaluation (above) reviewed with pt at visit  Patient voiced understanding of plan and provided with time to ask/review questions. After Visit Summary (AVS) provided to pt after visit with additional instructions as needed/reviewed. Future Appointments   Date Time Provider Williams Barone   6/24/2021  3:00 PM Memo Millan DO [facility] BS AMB   7/13/2021  8:00 AM Charlie Sicard, MD [facility] BS AMB   12/9/2021  8:30 AM Scarlett Mccord MD [facility] BS AMB   --Updated future visits after patient check-out. History of Present Illness:     Notes (nursing/rooming note copied below in italics):  As above. Here for physical.    Health Maintenance Due   Topic Date Due    Hepatitis C Screening  Never done    COVID-19 Vaccine (1) Never done    PAP AKA CERVICAL CYTOLOGY  05/30/2016    Colorectal Cancer Screening Combo  08/24/2020    Shingrix Vaccine Age 50> (2 of 2) 09/03/2020    A1C test (Diabetic or Prediabetic)  03/19/2021    Lipid Screen  03/19/2021     Screenings reviewed with pt at visit.       Nursing screenings reviewed by provider at visit. Vital signs, medical history (including PMH, FH, SH, History and Problem Lists), current medicines, allergies reviewed during visit. Prior to Admission medications    Medication Sig Start Date End Date Taking? Authorizing Provider   sulindac (CLINORIL) 200 mg tablet Take 1 Tab by mouth two (2) times daily as needed for Pain. Take instead of BC powder or other NSAIDs (ibuprofen, naprosyn). 4/8/21  Yes Kristine Hdz MD   atenoloL (TENORMIN) 100 mg tablet TAKE 1 TABLET DAILY 11/16/20  Yes Kristine Hdz MD   amLODIPine (NORVASC) 5 mg tablet TAKE 1 TABLET ONCE DAILY   WITH BLOOD PRESSURE        MEDICATIONS NIGHTLY 11/16/20  Yes Kristine Hdz MD   simvastatin (ZOCOR) 20 mg tablet TAKE 1 TABLET NIGHTLY 11/16/20  Yes Kristine Hdz MD   losartan (COZAAR) 100 mg tablet TAKE 1 TABLET DAILY 11/16/20  Yes Kristine Hdz MD   hydroCHLOROthiazide (HYDRODIURIL) 25 mg tablet TAKE 1 TABLET DAILY 11/16/20  Yes Kristine Hdz MD   cetirizine (ZYRTEC) 10 mg tablet Take 1 Tab by mouth daily. Patient not taking: Reported on 7/9/2020 1/17/17   Kristine Hdz MD        ROS    Vitals:    06/09/21 0813 06/09/21 0826   BP: (!) 143/82 130/84   Pulse: (!) 56    Resp: 14    Temp: 98.2 °F (36.8 °C)    TempSrc: Oral    SpO2: 95%    Weight: 190 lb 2 oz (86.2 kg)    Height: 5' 2\" (1.575 m)    PainSc:   0 - No pain       Her apple watch indicated 111/72--reviewed not accurate. Body mass index is 34.77 kg/m². Physical Exam:     Physical Exam  Vitals and nursing note reviewed. Constitutional:       General: She is not in acute distress. Appearance: Normal appearance. She is well-developed. She is not diaphoretic. HENT:      Head: Normocephalic and atraumatic.       Right Ear: Tympanic membrane, ear canal and external ear normal.      Left Ear: Tympanic membrane, ear canal and external ear normal.      Ears:      Comments: Moderate wax bilat. Eyes:      General: No scleral icterus. Right eye: No discharge. Left eye: No discharge. Conjunctiva/sclera: Conjunctivae normal.   Cardiovascular:      Rate and Rhythm: Normal rate and regular rhythm. Pulses: Normal pulses. Heart sounds: Normal heart sounds. No murmur heard. No friction rub. No gallop. Pulmonary:      Effort: Pulmonary effort is normal. No respiratory distress. Breath sounds: Normal breath sounds. No stridor. No wheezing, rhonchi or rales. Abdominal:      General: Bowel sounds are normal. There is no distension. Palpations: Abdomen is soft. Tenderness: There is no abdominal tenderness. There is no guarding. Musculoskeletal:         General: No swelling, tenderness, deformity or signs of injury. Cervical back: Neck supple. No rigidity. No muscular tenderness. Right lower leg: No edema. Left lower leg: No edema. Lymphadenopathy:      Cervical: No cervical adenopathy. Skin:     General: Skin is warm. Coloration: Skin is not jaundiced or pale. Findings: No bruising, erythema or rash. Neurological:      General: No focal deficit present. Mental Status: She is alert. Motor: No abnormal muscle tone. Coordination: Coordination normal.      Gait: Gait normal.   Psychiatric:         Mood and Affect: Mood normal.         Behavior: Behavior normal.         Thought Content: Thought content normal.         Judgment: Judgment normal.         Knee exam--right:  No patella or patellar tendon tenderness. No pain on palpation of medial or lateral joint line. No pain along medial collateral or lateral collateral ligaments. No effusion, erythema. Localizes pain to lower anterior portion of knee. Prior imaging from 2012 reviewed--medialty compartment narrowing and osteophytes. An electronic signature was used to authenticate this note.   -- Fred Gordon MD

## 2021-06-09 NOTE — PROGRESS NOTES
RM 16    Patient is fasting. Chief Complaint   Patient presents with    Complete Physical       1. Have you been to the ER, urgent care clinic since your last visit? Hospitalized since your last visit? Yes Reason for visit: UC, April 2021, right wrist swelling. 2. Have you seen or consulted any other health care providers outside of the [address] [place] [address] since your last visit? Include any pap smears or colon screening. Yes Reason for visit: Breast reduction-urbano andre. Dr. Man Vasquez, April 2021. Health Maintenance Due   Topic Date Due    Hepatitis C Screening  Never done    PAP AKA CERVICAL CYTOLOGY  05/30/2016    Colorectal Cancer Screening Combo  08/24/2020    Shingrix Vaccine Age 50> (2 of 2) 09/03/2020    A1C test (Diabetic or Prediabetic)  03/19/2021    Lipid Screen  03/19/2021       Abuse Screening Questionnaire 6/9/2021   Do you ever feel afraid of your partner? N   Are you in a relationship with someone who physically or mentally threatens you? N   Is it safe for you to go home?  Y       3 most recent PHQ Screens 6/9/2021   Little interest or pleasure in doing things Not at all   Feeling down, depressed, irritable, or hopeless Not at all   Total Score PHQ 2 0       Learning Assessment 7/9/2020   PRIMARY LEARNER Patient   HIGHEST LEVEL OF EDUCATION - PRIMARY LEARNER  -   BARRIERS PRIMARY LEARNER NONE   CO-LEARNER CAREGIVER -   PRIMARY LANGUAGE ENGLISH   LEARNER PREFERENCE PRIMARY DEMONSTRATION     -   ANSWERED BY patient   RELATIONSHIP SELF

## 2021-06-09 NOTE — PATIENT INSTRUCTIONS
Please follow the following instructions to process/authorize your referral, if needed: 
 
Referrals processing Please verify with your insurance IF you need referral authorization submitted. For insurance plans which require this, please follow the following steps. FAILURE TO DO SO MAY RESULT IN INABILITY TO SEE THE SPECIALIST YOU HAVE BEEN REFERRED TO (once you are scheduled to see them). 1. Call and schedule appointment with specialist 
2. Call our clinic and leave message with provider name, and date of appointment 3. We will then submit the referral to your insurance. This process takes 2-5 business days. If you have questions about scheduling or authorizing referral, you can review with our referral coordinator. You can review with her today if available/if you have time, or you can call to review once you have made your referral/appointment. If you are not sure if you need referral authorizations, please review with the referral coordinator(s), either prior to or after you have made the appointment, as reviewed. Well Visit, Women 48 to 72: Care Instructions Overview Well visits can help you stay healthy. Your doctor has checked your overall health and may have suggested ways to take good care of yourself. Your doctor also may have recommended tests. At home, you can help prevent illness with healthy eating, regular exercise, and other steps. Follow-up care is a key part of your treatment and safety. Be sure to make and go to all appointments, and call your doctor if you are having problems. It's also a good idea to know your test results and keep a list of the medicines you take. How can you care for yourself at home? · Get screening tests that you and your doctor decide on. Screening helps find diseases before any symptoms appear. · Eat healthy foods. Choose fruits, vegetables, whole grains, protein, and low-fat dairy foods. Limit fat, especially saturated fat.  Reduce salt in your diet. · Limit alcohol. Have no more than 1 drink a day or 7 drinks a week. · Get at least 30 minutes of exercise on most days of the week. Walking is a good choice. You also may want to do other activities, such as running, swimming, cycling, or playing tennis or team sports. · Reach and stay at a healthy weight. This will lower your risk for many problems, such as obesity, diabetes, heart disease, and high blood pressure. · Do not smoke. Smoking can make health problems worse. If you need help quitting, talk to your doctor about stop-smoking programs and medicines. These can increase your chances of quitting for good. · Care for your mental health. It is easy to get weighed down by worry and stress. Learn strategies to manage stress, like deep breathing and mindfulness, and stay connected with your family and community. If you find you often feel sad or hopeless, talk with your doctor. Treatment can help. · Talk to your doctor about whether you have any risk factors for sexually transmitted infections (STIs). You can help prevent STIs if you wait to have sex with a new partner (or partners) until you've each been tested for STIs. It also helps if you use condoms (male or female condoms) and if you limit your sex partners to one person who only has sex with you. Vaccines are available for some STIs. · If you think you may have a problem with alcohol or drug use, talk to your doctor. This includes prescription medicines (such as amphetamines and opioids) and illegal drugs (such as cocaine and methamphetamine). Your doctor can help you figure out what type of treatment is best for you. · Protect your skin from too much sun. When you're outdoors from 10 a.m. to 4 p.m., stay in the shade or cover up with clothing and a hat with a wide brim. Wear sunglasses that block UV rays. Even when it's cloudy, put broad-spectrum sunscreen (SPF 30 or higher) on any exposed skin.  
· See a dentist one or two times a year for checkups and to have your teeth cleaned. · Wear a seat belt in the car. When should you call for help? Watch closely for changes in your health, and be sure to contact your doctor if you have any problems or symptoms that concern you. Where can you learn more? Go to http://www.gray.com/ Enter E261 in the search box to learn more about \"Well Visit, Women 50 to 72: Care Instructions. \" Current as of: May 27, 2020               Content Version: 12.8 © 2006-2021 [employer]. Care instructions adapted under license by [employer] (which disclaims liability or warranty for this information). If you have questions about a medical condition or this instruction, always ask your healthcare professional. [address] any warranty or liability for your use of this information.

## 2021-06-23 DIAGNOSIS — M25.561 RIGHT KNEE PAIN, UNSPECIFIED CHRONICITY: ICD-10-CM

## 2021-06-23 DIAGNOSIS — M25.562 LEFT KNEE PAIN, UNSPECIFIED CHRONICITY: Primary | ICD-10-CM

## 2022-04-28 ENCOUNTER — OFFICE VISIT (OUTPATIENT)
Dept: INTERNAL MEDICINE CLINIC | Age: 62
End: 2022-04-28

## 2022-04-28 VITALS
TEMPERATURE: 97.7 F | OXYGEN SATURATION: 98 % | WEIGHT: 193 LBS | SYSTOLIC BLOOD PRESSURE: 148 MMHG | BODY MASS INDEX: 35.51 KG/M2 | HEART RATE: 65 BPM | DIASTOLIC BLOOD PRESSURE: 88 MMHG | RESPIRATION RATE: 16 BRPM | HEIGHT: 62 IN

## 2022-04-28 DIAGNOSIS — E78.00 HYPERCHOLESTEROLEMIA: ICD-10-CM

## 2022-04-28 DIAGNOSIS — M17.0 OSTEOARTHRITIS OF BOTH KNEES, UNSPECIFIED OSTEOARTHRITIS TYPE: ICD-10-CM

## 2022-04-28 DIAGNOSIS — I10 ESSENTIAL HYPERTENSION: ICD-10-CM

## 2022-04-28 PROCEDURE — 99214 OFFICE O/P EST MOD 30 MIN: CPT | Performed by: INTERNAL MEDICINE

## 2022-04-28 RX ORDER — LOSARTAN POTASSIUM 100 MG/1
100 TABLET ORAL DAILY
Qty: 90 TABLET | Refills: 1 | Status: SHIPPED | OUTPATIENT
Start: 2022-04-28

## 2022-04-28 RX ORDER — ROSUVASTATIN CALCIUM 20 MG/1
20 TABLET, COATED ORAL
Qty: 90 TABLET | Refills: 1 | Status: SHIPPED | OUTPATIENT
Start: 2022-04-28

## 2022-04-28 RX ORDER — SIMVASTATIN 20 MG/1
20 TABLET, FILM COATED ORAL
Qty: 90 TABLET | Refills: 1 | Status: CANCELLED | OUTPATIENT
Start: 2022-04-28

## 2022-04-28 RX ORDER — ATENOLOL 100 MG/1
100 TABLET ORAL DAILY
Qty: 90 TABLET | Refills: 1 | Status: SHIPPED | OUTPATIENT
Start: 2022-04-28

## 2022-04-28 RX ORDER — AMLODIPINE BESYLATE 5 MG/1
5 TABLET ORAL DAILY
Qty: 90 TABLET | Refills: 1 | Status: SHIPPED | OUTPATIENT
Start: 2022-04-28

## 2022-04-28 RX ORDER — HYDROCHLOROTHIAZIDE 25 MG/1
TABLET ORAL
Qty: 90 TABLET | Refills: 1 | Status: SHIPPED | OUTPATIENT
Start: 2022-04-28

## 2022-04-28 RX ORDER — SULINDAC 200 MG/1
200 TABLET ORAL
Qty: 180 TABLET | Refills: 1 | Status: SHIPPED | OUTPATIENT
Start: 2022-04-28

## 2022-04-28 NOTE — PROGRESS NOTES
Divya Collier (: 1960) is a 57 y.o. female, established patient, here for evaluation of the following chief complaint(s):  Chief Complaint   Patient presents with    Knee Pain     x 1 yr       Assessment and Plan:       ICD-10-CM ICD-9-CM    1. Essential hypertension  I10 401.9 amLODIPine (NORVASC) 5 mg tablet      atenoloL (TENORMIN) 100 mg tablet      hydroCHLOROthiazide (HYDRODIURIL) 25 mg tablet      losartan (COZAAR) 100 mg tablet   2. Hypercholesterolemia  E78.00 272.0 rosuvastatin (CRESTOR) 20 mg tablet   3. Osteoarthritis of both knees, unspecified osteoarthritis type  M17.0 715.96 sulindac (CLINORIL) 200 mg tablet      REFERRAL TO ORTHOPEDICS       1-2:  Refills reviwed with pt at visit. Plan Lab monitoring at follow-up reviewed. Change statin based on last lipds reviewed. 3.  Refill reviewed. Referral(s) and referral coordination reviewed with patient at visit. Follow-up and Dispositions    · Return in about 3 weeks (around 2022) for Blood Pressure follow-up, medication follow-up, non-fasting labs--2-4 weeks. lab results and schedule of future lab studies reviewed with patient  reviewed medications and side effects in detail    For additional documentation of information and/or recommendations discussed this visit, please see notes in instructions. Plan and evaluation (above) reviewed with pt at visit  Patient voiced understanding of plan and provided with time to ask/review questions. After Visit Summary (AVS) provided to pt after visit with additional instructions as needed/reviewed. Future Appointments   Date Time Provider Williams Barone   2022  8:30 AM Warren Rodriguez MD CP BS AMB   --Updated future visits after patient check-out. History of Present Illness:     Notes (nursing/rooming note copied below in italics):  States been out of meds for 2 months  Right knee pain 5/10  Has been having falls recently    LOV 2021.     Last labs  2021 also. BP Readings from Last 3 Encounters:   04/28/22 (!) 148/88   06/09/21 130/84   07/09/20 115/73       Notes she is trying to get disability. Reviewed should discuss with disability. Nursing screenings reviewed by provider at visit. Prior to Admission medications    Medication Sig Start Date End Date Taking? Authorizing Provider   sulindac (CLINORIL) 200 mg tablet Take 1 Tablet by mouth two (2) times daily as needed for Pain. Take instead of BC powder or other NSAIDs (ibuprofen, naprosyn). 6/9/21  Yes Jae Evans MD   atenoloL (TENORMIN) 100 mg tablet Take 1 Tablet by mouth daily. 6/9/21  Yes Jae Evans MD   losartan (COZAAR) 100 mg tablet Take 1 Tablet by mouth daily. 6/9/21  Yes Jae Evans MD   simvastatin (ZOCOR) 20 mg tablet Take 1 Tablet by mouth nightly. 6/9/21  Yes Jae Evans MD   hydroCHLOROthiazide (HYDRODIURIL) 25 mg tablet TAKE 1 TABLET DAILY 6/9/21  Yes Jae Evans MD   amLODIPine (NORVASC) 5 mg tablet Take 1 Tablet by mouth daily. 6/9/21  Yes Jae Evans MD        ROS    Vitals:    04/28/22 1601 04/28/22 1603   BP: (!) 151/90 (!) 148/88   Pulse: 65    Resp: 16    Temp: 97.7 °F (36.5 °C)    TempSrc: Oral    SpO2: 98%    Weight: 193 lb (87.5 kg)    Height: 5' 2\" (1.575 m)       Body mass index is 35.3 kg/m². Physical Exam:     Physical Exam  Vitals and nursing note reviewed. Constitutional:       General: She is not in acute distress. Appearance: Normal appearance. She is well-developed. She is not diaphoretic. HENT:      Head: Normocephalic and atraumatic. Mouth/Throat:      Mouth: Mucous membranes are moist.   Eyes:      General: No scleral icterus. Right eye: No discharge. Left eye: No discharge. Conjunctiva/sclera: Conjunctivae normal.   Cardiovascular:      Rate and Rhythm: Normal rate and regular rhythm. Pulses: Normal pulses. Heart sounds: Normal heart sounds.  No murmur heard. No friction rub. No gallop. Pulmonary:      Effort: Pulmonary effort is normal. No respiratory distress. Breath sounds: Normal breath sounds. No stridor. No wheezing, rhonchi or rales. Abdominal:      General: Bowel sounds are normal. There is no distension. Palpations: Abdomen is soft. Tenderness: There is no abdominal tenderness. There is no guarding. Musculoskeletal:         General: No swelling, tenderness, deformity or signs of injury. Right lower leg: No edema. Left lower leg: No edema. Skin:     General: Skin is warm. Coloration: Skin is not jaundiced or pale. Findings: No bruising, erythema or rash. Neurological:      General: No focal deficit present. Mental Status: She is alert. Motor: No abnormal muscle tone. Coordination: Coordination normal.      Gait: Gait normal.   Psychiatric:         Mood and Affect: Mood normal.         Behavior: Behavior normal.         Thought Content: Thought content normal.         Judgment: Judgment normal.         An electronic signature was used to authenticate this note.   -- Daljit Dunaway MD

## 2022-04-28 NOTE — PROGRESS NOTES
rm 18    States been out of meds for 2 months  Right knee pain 5/10  Has been having falls recently    Chief Complaint   Patient presents with    Knee Pain     x 1 yr     1. Have you been to the ER, urgent care clinic since your last visit? Hospitalized since your last visit? Yes      2. Have you seen or consulted any other health care providers outside of the [address] [place] [address] since your last visit? Include any pap smears or colon screening.  No    Visit Vitals  BP (!) 148/88 (BP 1 Location: Left arm, BP Patient Position: Sitting, BP Cuff Size: Adult)   Pulse 65   Temp 97.7 °F (36.5 °C) (Oral)   Resp 16   Ht 5' 2\" (1.575 m)   Wt 193 lb (87.5 kg)   SpO2 98%   BMI 35.30 kg/m²

## 2022-05-10 DIAGNOSIS — M25.562 LEFT KNEE PAIN, UNSPECIFIED CHRONICITY: Primary | ICD-10-CM

## 2022-05-10 DIAGNOSIS — M25.561 RIGHT KNEE PAIN, UNSPECIFIED CHRONICITY: ICD-10-CM

## 2022-05-12 ENCOUNTER — OFFICE VISIT (OUTPATIENT)
Dept: ORTHOPEDIC SURGERY | Age: 62
End: 2022-05-12

## 2022-05-12 ENCOUNTER — HOSPITAL ENCOUNTER (OUTPATIENT)
Dept: GENERAL RADIOLOGY | Age: 62
Discharge: HOME | End: 2022-05-12
Payer: COMMERCIAL

## 2022-05-12 VITALS
WEIGHT: 193 LBS | HEART RATE: 60 BPM | SYSTOLIC BLOOD PRESSURE: 120 MMHG | HEIGHT: 62 IN | TEMPERATURE: 96.9 F | BODY MASS INDEX: 35.51 KG/M2 | OXYGEN SATURATION: 98 % | DIASTOLIC BLOOD PRESSURE: 74 MMHG

## 2022-05-12 DIAGNOSIS — M25.562 LEFT KNEE PAIN, UNSPECIFIED CHRONICITY: ICD-10-CM

## 2022-05-12 DIAGNOSIS — M25.561 RIGHT KNEE PAIN, UNSPECIFIED CHRONICITY: ICD-10-CM

## 2022-05-12 DIAGNOSIS — M17.11 UNILATERAL PRIMARY OSTEOARTHRITIS, RIGHT KNEE: Primary | ICD-10-CM

## 2022-05-12 PROCEDURE — 99203 OFFICE O/P NEW LOW 30 MIN: CPT | Performed by: ORTHOPAEDIC SURGERY

## 2022-05-12 PROCEDURE — 73564 X-RAY EXAM KNEE 4 OR MORE: CPT

## 2022-05-12 NOTE — LETTER
5/12/2022    Patient: Nic Cannon   YOB: 1960   Date of Visit: 5/12/2022     Sierra Alvarez MD  [address] [place] [phone]  Via In Basket    Dear Sierra Alvarez MD,      Thank you for referring Ms. Nic Cannon to [facility] for evaluation. My notes for this consultation are attached. If you have questions, please do not hesitate to call me. I look forward to following your patient along with you.       Sincerely,    Oscar Burnette, DO

## 2022-05-12 NOTE — PROGRESS NOTES
Identified pt with two pt identifiers (name and ). Reviewed chart in preparation for visit and have obtained necessary documentation. Shivani Arana is a 57 y.o. female  Chief Complaint   Patient presents with    Knee Pain     Bilateral knee     Visit Vitals  /74 (BP 1 Location: Right arm, BP Patient Position: Sitting, BP Cuff Size: Large adult)   Pulse 60   Temp 96.9 °F (36.1 °C) (Tympanic)   Ht 5' 2\" (1.575 m)   Wt 193 lb (87.5 kg)   SpO2 98%   BMI 35.30 kg/m²     1. Have you been to the ER, urgent care clinic since your last visit? Hospitalized since your last visit? No    2. Have you seen or consulted any other health care providers outside of the [address] [place] [address] since your last visit? Include any pap smears or colon screening.  No
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
## 2022-05-17 ENCOUNTER — TELEPHONE (OUTPATIENT)
Dept: ORTHOPEDIC SURGERY | Age: 62
End: 2022-05-17

## 2022-05-17 NOTE — TELEPHONE ENCOUNTER
----- Message from Delonte Dempsey DO sent at 5/12/2022 12:29 PM EDT -----  Diagnosis: Unilateral Primary Osteoarthritis, right knee M17.11  Procedure: Right total knee arthroplasty   CPT: 33452  Operative minutes: 110  Inpatient  Location: [facility] Main OR  PAT: Yes  Class: Yes  Special Equipment: Regular table, Budny foot cummings, Michaela Triathlon, Plan for cemented TKA, foot cummings for prepping  Staffing: Retractor cummings  Anesthesia: spinal with adductor canal block

## 2022-05-17 NOTE — TELEPHONE ENCOUNTER
Contacted patient to schedule surgery. Scheduled for 6/16/2022. Advised patient that clearances from PCP would be required, and would need to be received no less than 2 business days before surgery. Patient advised to contact the office(s) to make pre op appts as soon as possible. Patient is planning on discharging home as her son lives with her and will be able to care for her. Patient verbalized understanding of all information provided and was encouraged to contact our office with any questions or concerns obtaining to upcoming surgery or required clearances. Clearance letters faxed to PCP. Confirmation was received.

## 2022-05-19 ENCOUNTER — OFFICE VISIT (OUTPATIENT)
Dept: INTERNAL MEDICINE CLINIC | Age: 62
End: 2022-05-19

## 2022-05-19 VITALS
TEMPERATURE: 97.8 F | HEART RATE: 57 BPM | OXYGEN SATURATION: 98 % | BODY MASS INDEX: 35.51 KG/M2 | SYSTOLIC BLOOD PRESSURE: 125 MMHG | DIASTOLIC BLOOD PRESSURE: 78 MMHG | RESPIRATION RATE: 16 BRPM | HEIGHT: 62 IN | WEIGHT: 193 LBS

## 2022-05-19 DIAGNOSIS — E78.00 HYPERCHOLESTEROLEMIA: ICD-10-CM

## 2022-05-19 DIAGNOSIS — I10 ESSENTIAL HYPERTENSION: Primary | ICD-10-CM

## 2022-05-19 DIAGNOSIS — E55.9 HYPOVITAMINOSIS D: ICD-10-CM

## 2022-05-19 DIAGNOSIS — Z12.11 SCREENING FOR COLON CANCER: ICD-10-CM

## 2022-05-19 DIAGNOSIS — R73.09 ELEVATED HEMOGLOBIN A1C: ICD-10-CM

## 2022-05-19 DIAGNOSIS — Z98.890 HISTORY OF COLONOSCOPY: ICD-10-CM

## 2022-05-19 PROCEDURE — 99214 OFFICE O/P EST MOD 30 MIN: CPT | Performed by: INTERNAL MEDICINE

## 2022-05-19 NOTE — PATIENT INSTRUCTIONS
1.  If needed, you can contact:    [facility] at [phone]. Since you had prior colonoscopy with Dr. Leno Meléndez. 2.  Please follow the following instructions to process/authorize your referral, if needed:    Referrals processing  Please verify with your insurance IF you need referral authorization submitted. For insurance plans which require this, please follow the following steps. FAILURE TO DO SO MAY RESULT IN INABILITY TO SEE THE SPECIALIST YOU HAVE BEEN REFERRED TO (once you are scheduled to see them). 1. Call and schedule appointment with specialist  2. Call our clinic and leave message with provider name, and date of appointment  3. We will then submit the referral to your insurance. This process takes 2-5 business days. If you have questions about scheduling or authorizing referral, you can review with our referral coordinator. You can review with her today if available/if you have time, or you can call to review once you have made your referral/appointment. If you are not sure if you need referral authorizations, please review with the referral coordinator(s), either prior to or after you have made the appointment, as reviewed.

## 2022-05-19 NOTE — PROGRESS NOTES
rm 16    Is fasting  Right knee replacement for 6/16/22 dr Neli Chopra    Chief Complaint   Patient presents with    Follow-up     BP, labs     1. Have you been to the ER, urgent care clinic since your last visit? Hospitalized since your last visit? No    2. Have you seen or consulted any other health care providers outside of the [address] [place] [address] since your last visit? Include any pap smears or colon screening.  No     Visit Vitals  /78 (BP 1 Location: Left arm, BP Patient Position: Sitting, BP Cuff Size: Adult)   Pulse (!) 57   Temp 97.8 °F (36.6 °C) (Oral)   Resp 16   Ht 5' 2\" (1.575 m)   Wt 193 lb (87.5 kg)   SpO2 98%   BMI 35.30 kg/m²

## 2022-05-19 NOTE — PROGRESS NOTES
Nasima Catherine (: 1960) is a 57 y.o. female, established patient, here for evaluation of the following chief complaint(s):  Chief Complaint   Patient presents with    Follow-up     BP, labs       Assessment and Plan:       ICD-10-CM ICD-9-CM    1. Essential hypertension  I10 401.9 CBC WITH AUTOMATED DIFF      LIPID PANEL      RENAL FUNCTION PANEL   2. Hypercholesterolemia  E78.00 272.0 LIPID PANEL      CK      HEPATIC FUNCTION PANEL   3. Elevated hemoglobin A1c  R73.09 790.29 HEMOGLOBIN A1C WITH EAG      RENAL FUNCTION PANEL   4. Hypovitaminosis D  E55.9 268.9 VITAMIN D, 25 HYDROXY      RENAL FUNCTION PANEL   5. History of colonoscopy  Z98.890 V45.89 REFERRAL TO GASTROENTEROLOGY   6. Screening for colon cancer  Z12.11 V76.51 REFERRAL TO GASTROENTEROLOGY       1-4:  Lab monitoring reviewed. Continue current medications pending lab results/review. 5,6:  Referral(s) and referral coordination reviewed with patient at visit. Follow-up and Dispositions    · Return in about 3 months (around 2022) for Blood Pressure follow-up, fasting labs. lab results and schedule of future lab studies reviewed with patient  reviewed medications and side effects in detail    For additional documentation of information and/or recommendations discussed this visit, please see notes in instructions. Plan and evaluation (above) reviewed with pt at visit  Patient voiced understanding of plan and provided with time to ask/review questions. After Visit Summary (AVS) provided to pt after visit with additional instructions as needed/reviewed. Future Appointments   Date Time Provider Williams Barone   2022 10:30 AM [facility] PAT ROOM P2 [facility] PAT RI OR PRE AS   2022  2:00 PM Arnulfo Barrios DO [facility] BS AMB   2022  8:30 AM Jayy Melendez MD [facility] BS AMB   --Updated future visits after patient check-out. History of Present Illness:     Notes (nursing/rooming note copied below in italics):   Is fasting  Right knee replacement for 6/16/22 dr Chon Orellana    She has plan with Ortho to schedule pre-op visit 1-2 weeks prior to procedure. Has not scheduled at this time. She has not had problems with re-starting medications. Re-started rosuvastatin instead of simva with refill 4-28-22. No problems with change. She notes she has not rec'd one med. She only received 30-day supply---reviewed would need to d/w pharmacy as wrote for 90-day supplies. Plan for follow-up reviewed. She is interested in scheduling routine 10-year colonoscopy. Done 8-2010 with Dr. Leland Grullon. Reviewed referral at visit. Printed copy of prior colonoscopy in case does with GI instead of colo-rectal surgery. Referral is for GI to do. Nursing screenings reviewed by provider at visit. Prior to Admission medications    Medication Sig Start Date End Date Taking? Authorizing Provider   amLODIPine (NORVASC) 5 mg tablet Take 1 Tablet by mouth daily. 4/28/22  Yes Salvador Billings MD   atenoloL (TENORMIN) 100 mg tablet Take 1 Tablet by mouth daily. 4/28/22  Yes Salvador Billings MD   hydroCHLOROthiazide (HYDRODIURIL) 25 mg tablet TAKE 1 TABLET DAILY 4/28/22  Yes Salvador Billings MD   losartan (COZAAR) 100 mg tablet Take 1 Tablet by mouth daily. 4/28/22  Yes Salvador Billings MD   sulindac (CLINORIL) 200 mg tablet Take 1 Tablet by mouth two (2) times daily as needed for Pain. Take instead of BC powder or other NSAIDs (ibuprofen, naprosyn). 4/28/22  Yes Salvador Billings MD   rosuvastatin (CRESTOR) 20 mg tablet Take 1 Tablet by mouth nightly. 4/28/22  Yes Salvador Billings MD        ROS    Vitals:    05/19/22 0821   BP: 125/78   Pulse: (!) 57   Resp: 16   Temp: 97.8 °F (36.6 °C)   TempSrc: Oral   SpO2: 98%   Weight: 193 lb (87.5 kg)   Height: 5' 2\" (1.575 m)      Body mass index is 35.3 kg/m². Physical Exam:     Physical Exam  Vitals and nursing note reviewed.    Constitutional:       General: She is not in acute distress. Appearance: Normal appearance. She is well-developed. She is not diaphoretic. HENT:      Head: Normocephalic and atraumatic. Eyes:      General: No scleral icterus. Right eye: No discharge. Left eye: No discharge. Conjunctiva/sclera: Conjunctivae normal.   Cardiovascular:      Rate and Rhythm: Normal rate and regular rhythm. Pulses: Normal pulses. Heart sounds: Normal heart sounds. No murmur heard. No friction rub. No gallop. Pulmonary:      Effort: Pulmonary effort is normal. No respiratory distress. Breath sounds: Normal breath sounds. No stridor. No wheezing, rhonchi or rales. Abdominal:      General: Bowel sounds are normal. There is no distension. Palpations: Abdomen is soft. Tenderness: There is no abdominal tenderness. There is no guarding. Musculoskeletal:         General: No swelling, tenderness, deformity or signs of injury. Right lower leg: No edema. Left lower leg: No edema. Skin:     General: Skin is warm. Coloration: Skin is not jaundiced or pale. Findings: No bruising, erythema or rash. Neurological:      General: No focal deficit present. Mental Status: She is alert. Motor: No abnormal muscle tone. Coordination: Coordination normal.      Gait: Gait normal.   Psychiatric:         Mood and Affect: Mood normal.         Behavior: Behavior normal.         Thought Content: Thought content normal.         Judgment: Judgment normal.         An electronic signature was used to authenticate this note.   -- Rashida Duarte MD

## 2022-05-20 LAB
25(OH)D3 SERPL-MCNC: 16.4 NG/ML (ref 30–100)
ALBUMIN SERPL-MCNC: 4 G/DL (ref 3.5–5)
ALBUMIN SERPL-MCNC: 4.1 G/DL (ref 3.5–5)
ALBUMIN/GLOB SERPL: 0.9 {RATIO} (ref 1.1–2.2)
ALP SERPL-CCNC: 119 U/L (ref 45–117)
ALT SERPL-CCNC: 18 U/L (ref 12–78)
ANION GAP SERPL CALC-SCNC: 6 MMOL/L (ref 5–15)
AST SERPL-CCNC: 18 U/L (ref 15–37)
BASOPHILS # BLD: 0 K/UL (ref 0–0.1)
BASOPHILS NFR BLD: 1 % (ref 0–1)
BILIRUB DIRECT SERPL-MCNC: 0.2 MG/DL (ref 0–0.2)
BILIRUB SERPL-MCNC: 0.8 MG/DL (ref 0.2–1)
BUN SERPL-MCNC: 20 MG/DL (ref 6–20)
BUN/CREAT SERPL: 15 (ref 12–20)
CALCIUM SERPL-MCNC: 9.3 MG/DL (ref 8.5–10.1)
CHLORIDE SERPL-SCNC: 103 MMOL/L (ref 97–108)
CHOLEST SERPL-MCNC: 238 MG/DL
CK SERPL-CCNC: 111 U/L (ref 26–192)
CO2 SERPL-SCNC: 29 MMOL/L (ref 21–32)
CREAT SERPL-MCNC: 1.31 MG/DL (ref 0.55–1.02)
DIFFERENTIAL METHOD BLD: ABNORMAL
EOSINOPHIL # BLD: 0.2 K/UL (ref 0–0.4)
EOSINOPHIL NFR BLD: 3 % (ref 0–7)
ERYTHROCYTE [DISTWIDTH] IN BLOOD BY AUTOMATED COUNT: 13.3 % (ref 11.5–14.5)
EST. AVERAGE GLUCOSE BLD GHB EST-MCNC: 120 MG/DL
GLOBULIN SER CALC-MCNC: 4.4 G/DL (ref 2–4)
GLUCOSE SERPL-MCNC: 104 MG/DL (ref 65–100)
HBA1C MFR BLD: 5.8 % (ref 4–5.6)
HCT VFR BLD AUTO: 48 % (ref 35–47)
HDLC SERPL-MCNC: 63 MG/DL
HDLC SERPL: 3.8 {RATIO} (ref 0–5)
HGB BLD-MCNC: 14.7 G/DL (ref 11.5–16)
IMM GRANULOCYTES # BLD AUTO: 0 K/UL (ref 0–0.04)
IMM GRANULOCYTES NFR BLD AUTO: 0 % (ref 0–0.5)
LDLC SERPL CALC-MCNC: 129 MG/DL (ref 0–100)
LYMPHOCYTES # BLD: 1.8 K/UL (ref 0.8–3.5)
LYMPHOCYTES NFR BLD: 28 % (ref 12–49)
MCH RBC QN AUTO: 31.3 PG (ref 26–34)
MCHC RBC AUTO-ENTMCNC: 30.6 G/DL (ref 30–36.5)
MCV RBC AUTO: 102.1 FL (ref 80–99)
MONOCYTES # BLD: 0.3 K/UL (ref 0–1)
MONOCYTES NFR BLD: 5 % (ref 5–13)
NEUTS SEG # BLD: 4.1 K/UL (ref 1.8–8)
NEUTS SEG NFR BLD: 63 % (ref 32–75)
NRBC # BLD: 0 K/UL (ref 0–0.01)
NRBC BLD-RTO: 0 PER 100 WBC
PHOSPHATE SERPL-MCNC: 3.2 MG/DL (ref 2.6–4.7)
PLATELET # BLD AUTO: 289 K/UL (ref 150–400)
PMV BLD AUTO: 9.9 FL (ref 8.9–12.9)
POTASSIUM SERPL-SCNC: 4.4 MMOL/L (ref 3.5–5.1)
PROT SERPL-MCNC: 8.5 G/DL (ref 6.4–8.2)
RBC # BLD AUTO: 4.7 M/UL (ref 3.8–5.2)
SODIUM SERPL-SCNC: 138 MMOL/L (ref 136–145)
TRIGL SERPL-MCNC: 230 MG/DL (ref ?–150)
VLDLC SERPL CALC-MCNC: 46 MG/DL
WBC # BLD AUTO: 6.4 K/UL (ref 3.6–11)

## 2022-05-31 RX ORDER — ERGOCALCIFEROL 1.25 MG/1
50000 CAPSULE ORAL
Qty: 12 CAPSULE | Refills: 0 | Status: SHIPPED | OUTPATIENT
Start: 2022-05-31 | End: 2022-08-17

## 2022-06-06 ENCOUNTER — HOSPITAL ENCOUNTER (OUTPATIENT)
Dept: PREADMISSION TESTING | Age: 62
Discharge: HOME | End: 2022-06-06
Attending: ORTHOPAEDIC SURGERY
Payer: COMMERCIAL

## 2022-06-06 VITALS
BODY MASS INDEX: 36.27 KG/M2 | DIASTOLIC BLOOD PRESSURE: 77 MMHG | HEIGHT: 62 IN | WEIGHT: 197.09 LBS | RESPIRATION RATE: 15 BRPM | TEMPERATURE: 98 F | SYSTOLIC BLOOD PRESSURE: 141 MMHG | HEART RATE: 57 BPM | OXYGEN SATURATION: 98 %

## 2022-06-06 LAB
ABO + RH BLD: NORMAL
ALBUMIN SERPL-MCNC: 3.9 G/DL (ref 3.5–5)
ALBUMIN/GLOB SERPL: 0.8 {RATIO} (ref 1.1–2.2)
ALP SERPL-CCNC: 116 U/L (ref 45–117)
ALT SERPL-CCNC: 23 U/L (ref 12–78)
ANION GAP SERPL CALC-SCNC: 5 MMOL/L (ref 5–15)
APPEARANCE UR: CLEAR
AST SERPL-CCNC: 27 U/L (ref 15–37)
ATRIAL RATE: 52 BPM
BACTERIA URNS QL MICRO: NEGATIVE /HPF
BILIRUB SERPL-MCNC: 0.9 MG/DL (ref 0.2–1)
BILIRUB UR QL: NEGATIVE
BLOOD GROUP ANTIBODIES SERPL: NORMAL
BUN SERPL-MCNC: 16 MG/DL (ref 6–20)
BUN/CREAT SERPL: 13 (ref 12–20)
CALCIUM SERPL-MCNC: 9.6 MG/DL (ref 8.5–10.1)
CALCULATED P AXIS, ECG09: 30 DEGREES
CALCULATED R AXIS, ECG10: 6 DEGREES
CALCULATED T AXIS, ECG11: 9 DEGREES
CHLORIDE SERPL-SCNC: 102 MMOL/L (ref 97–108)
CO2 SERPL-SCNC: 28 MMOL/L (ref 21–32)
COLOR UR: ABNORMAL
CREAT SERPL-MCNC: 1.26 MG/DL (ref 0.55–1.02)
DIAGNOSIS, 93000: NORMAL
EPITH CASTS URNS QL MICRO: ABNORMAL /LPF
ERYTHROCYTE [DISTWIDTH] IN BLOOD BY AUTOMATED COUNT: 12.9 % (ref 11.5–14.5)
GLOBULIN SER CALC-MCNC: 4.6 G/DL (ref 2–4)
GLUCOSE SERPL-MCNC: 108 MG/DL (ref 65–100)
GLUCOSE UR STRIP.AUTO-MCNC: NEGATIVE MG/DL
HCT VFR BLD AUTO: 42.2 % (ref 35–47)
HGB BLD-MCNC: 13.8 G/DL (ref 11.5–16)
HGB UR QL STRIP: NEGATIVE
HYALINE CASTS URNS QL MICRO: ABNORMAL /LPF (ref 0–2)
INR PPP: 1 (ref 0.9–1.1)
KETONES UR QL STRIP.AUTO: NEGATIVE MG/DL
LEUKOCYTE ESTERASE UR QL STRIP.AUTO: NEGATIVE
MCH RBC QN AUTO: 31 PG (ref 26–34)
MCHC RBC AUTO-ENTMCNC: 32.7 G/DL (ref 30–36.5)
MCV RBC AUTO: 94.8 FL (ref 80–99)
NITRITE UR QL STRIP.AUTO: NEGATIVE
NRBC # BLD: 0 K/UL (ref 0–0.01)
NRBC BLD-RTO: 0 PER 100 WBC
P-R INTERVAL, ECG05: 168 MS
PH UR STRIP: 6.5 [PH] (ref 5–8)
PLATELET # BLD AUTO: 240 K/UL (ref 150–400)
PMV BLD AUTO: 9.2 FL (ref 8.9–12.9)
POTASSIUM SERPL-SCNC: 3.8 MMOL/L (ref 3.5–5.1)
PROT SERPL-MCNC: 8.5 G/DL (ref 6.4–8.2)
PROT UR STRIP-MCNC: 30 MG/DL
PROTHROMBIN TIME: 10.1 SEC (ref 9–11.1)
Q-T INTERVAL, ECG07: 482 MS
QRS DURATION, ECG06: 86 MS
QTC CALCULATION (BEZET), ECG08: 448 MS
RBC # BLD AUTO: 4.45 M/UL (ref 3.8–5.2)
RBC #/AREA URNS HPF: ABNORMAL /HPF (ref 0–5)
SODIUM SERPL-SCNC: 135 MMOL/L (ref 136–145)
SP GR UR REFRACTOMETRY: 1.02
SPECIMEN EXP DATE BLD: NORMAL
UA: UC IF INDICATED,UAUC: ABNORMAL
UROBILINOGEN UR QL STRIP.AUTO: 1 EU/DL (ref 0.2–1)
VENTRICULAR RATE, ECG03: 52 BPM
WBC # BLD AUTO: 7.2 K/UL (ref 3.6–11)
WBC URNS QL MICRO: ABNORMAL /HPF (ref 0–4)

## 2022-06-06 PROCEDURE — 97530 THERAPEUTIC ACTIVITIES: CPT | Performed by: PHYSICAL THERAPIST

## 2022-06-06 PROCEDURE — 97161 PT EVAL LOW COMPLEX 20 MIN: CPT | Performed by: PHYSICAL THERAPIST

## 2022-06-06 PROCEDURE — 86900 BLOOD TYPING SEROLOGIC ABO: CPT

## 2022-06-06 PROCEDURE — 93005 ELECTROCARDIOGRAM TRACING: CPT

## 2022-06-06 PROCEDURE — 85027 COMPLETE CBC AUTOMATED: CPT

## 2022-06-06 PROCEDURE — 36415 COLL VENOUS BLD VENIPUNCTURE: CPT

## 2022-06-06 PROCEDURE — 80053 COMPREHEN METABOLIC PANEL: CPT

## 2022-06-06 PROCEDURE — 81001 URINALYSIS AUTO W/SCOPE: CPT

## 2022-06-06 PROCEDURE — 85610 PROTHROMBIN TIME: CPT

## 2022-06-06 RX ORDER — SODIUM CHLORIDE, SODIUM LACTATE, POTASSIUM CHLORIDE, CALCIUM CHLORIDE 600; 310; 30; 20 MG/100ML; MG/100ML; MG/100ML; MG/100ML
25 INJECTION, SOLUTION INTRAVENOUS CONTINUOUS
Status: CANCELLED | OUTPATIENT
Start: 2022-06-16

## 2022-06-06 NOTE — PERIOP NOTES
Incentive Spirometer        Using the incentive spirometer helps expand the small air sacs of your lungs, helps you breathe deeply, and helps improve your lung function. Use your incentive spirometer twice a day (10 breaths each time) prior to surgery. How to Use Your Incentive Spirometer:  1. Hold the incentive spirometer in an upright position. 2. Breathe out as usual.   3. Place the mouthpiece in your mouth and seal your lips tightly around it. 4. Take a deep breath. Breathe in slowly and as deeply as possible. Keep the blue flow rate guide between the arrows. 5. Hold your breath as long as possible. Then exhale slowly and allow the piston to fall to the bottom of the column. 6. Rest for a few seconds and repeat steps one through five at least 10 times. PAT Tidal Volume____2000______________  x_______1_________  Date__6/6/22_____________________    Joe Mccall THE INCENTIVE SPIROMETER WITH YOU TO THE HOSPITAL ON THE DAY OF YOUR SURGERY. Opportunity given to ask and answer questions as well as to observe return demonstration.     Patient signature_____________________________    Witness____________________________

## 2022-06-06 NOTE — PERIOP NOTES
[place] [facility]  Joint/Spine Preoperative Instructions        Surgery Date 6/16/22          Time of Arrival:  To Be Called  Contact # [phone]    1. On the day of your surgery, please report to the Surgical Services Registration Desk and sign in at your designated time. The Surgery Center is located to the right of the Emergency Room. 2. You must have someone with you to drive you home. You should not drive a car for 24 hours following surgery. Please make arrangements for a friend or family member to stay with you for the first 24 hours after your surgery. 3. No food after midnight 6/15/22. Medications morning of surgery should be taken with a sip of water. Please follow pre-surgery drink instructions that were given at your Pre Admission Testing appointment. 4. We recommend you do not drink any alcoholic beverages for 24 hours before and after your surgery. 5. Contact your surgeons office for instructions on the following medications: non-steroidal anti-inflammatory drugs (i.e. Advil, Aleve), vitamins, and supplements. (Some surgeons will want you to stop these medications prior to surgery and others may allow you to take them)  **If you are currently taking Plavix, Coumadin, Aspirin and/or other blood-thinning agents, contact your surgeon for instructions. ** Your surgeon will partner with the physician prescribing these medications to determine if it is safe to stop or if you need to continue taking. Please do not stop taking these medications without instructions from your surgeon    6. Wear comfortable clothes. Wear glasses instead of contacts. Do not bring any money or jewelry. Please bring picture ID, insurance card, and any prearranged co-payment or hospital payment. Do not wear make-up, particularly mascara the morning of your surgery. Do not wear nail polish, particularly if you are having foot /hand surgery.   Wear your hair loose or down, no ponytails, buns, rita pins or clips. All body piercings must be removed. Please shower with antibacterial soap for three consecutive days before and on the morning of surgery, but do not apply any lotions, powders or deodorants after the shower on the day of surgery. Please use a fresh towels after each shower. Please sleep in clean clothes and change bed linens the night before surgery. Please do not shave for 48 hours prior to surgery. Shaving of the face is acceptable. 7. You should understand that if you do not follow these instructions your surgery may be cancelled. If your physical condition changes (I.e. fever, cold or flu) please contact your surgeon as soon as possible. 8. It is important that you be on time. If a situation occurs where you may be late, please call [phone] (OR Holding Area). 9. If you have any questions and or problems, please call [phone] (Pre-admission Testing). 10. Your surgery time may be subject to change. You will receive a phone call the evening prior if your time changes. 11.  If having outpatient surgery, you must have someone to drive you here, stay with you during the duration of your stay, and to drive you home at time of discharge. 12. The following link is for the educational video for patients and/or families. [email or web address][email or web address]-materials    13  Due to current COVID restrictions, only ONE adult may accompany you the day of the procedure. We have limited seating available. If our waiting room is at capacity, your ride may be asked to remain in their vehicle. No children are allowed in the waiting room    Special Instructions: Use Incentive Spirometer 20 times daily prior to surgery. TAKE ALL MEDICATIONS THE DAY OF SURGERY EXCEPT: None      I understand a pre-operative phone call will be made to verify my surgery time.   In the event that I am not available, I give permission for a message to be left on my answering service and/or with another person?   yes         ___________________      __________   _________    (Signature of Patient)             (Witness)                (Date and Time)

## 2022-06-06 NOTE — PROGRESS NOTES
Patient attended the Joint Replacement Education Class at [facility]. The content of the class was presented using a power point presentation specific for patients undergoing hip and knee replacement surgery. The [facility] Joint Replacement Education Handbook was given to the patient. Preparing for surgery, day of surgery routine and expectations, discharge process and help at home expectations, nutrition,medications, infection control, pain management, DVT prevention, ice therapy and safety were reviewed in class. Opportunity for questions provided, patient verbalized understanding of instructions.

## 2022-06-06 NOTE — PERIOP NOTES
Orthopedic and Spine Patients: Instructions on When You Can   Eat or Drink Before Surgery      You have been provided 2 pre-surgery drinks received at your pre-admission testing appointment.  Night before surgery:  o You should drink one bottle of the  pre-surgery drink at bedtime. No food after midnight!  Day of Surgery:  o Complete 2nd bottle of the pre-surgery drink 1 hour prior to arrival at hospital.      For questions call Pre-Admission Testing at [phone]. They are available from 8:00am-5:00pm, Monday through Friday.
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
## 2022-06-06 NOTE — PERIOP NOTES
Hibiclens/Chlorhexidine    Preventing Infections Before and After - Your Surgery    IMPORTANT INSTRUCTIONS    Please read and follow these instructions carefully. If you are unable to comply with the below instructions your procedure will be cancelled. Every Night for Three (3) nights before your surgery:  1. Shower with an antibacterial soap, such as Dial, or the soap provided at your preassessment appointment. A shower is better than a bath for cleaning your skin. 2. If needed, ask someone to help you reach all areas of your body. Dont forget to clean your belly button with every shower. The night before your surgery: If you lose your Hibiclens/chlorhexidine please contact surgery center or you can purchase it at a local pharmacy  1. On the night before your surgery, shower with an antibacterial soap, such as Dial, or the soap provided at your preassessment appointment. 2. With one packet of Hibiclens/Chlorhexidine in hand, turn water off.  3. Apply Hibiclens antiseptic skin cleanser with a clean, freshly washed washcloth. ? Gently apply to your body from chin to toes (except the genital area) and especially the area(s) where your incision(s) will be. ? Leave Hibiclens/Chlorhexidine on your skin for at least 20 seconds. CAUTION: If needed, Hibiclens/chlorhexidine may be used to clean the folds of skin of the legs (such as in the area of the groin) and on your buttocks and hips. However, do not use Hibiclens/Chlorhexidine above the neck or in the genital area (your bottom) or put inside any area of your body. 4. Turn the water back on and rinse. 5. Dry gently with a clean, freshly washed towel. 6. After your shower, do not use any powder, deodorant, perfumes or lotion. 7. Use clean, freshly washed towels and washcloths every time you shower. 8. Wear clean, freshly washed pajamas to bed the night before surgery. 9. Sleep on clean, freshly washed sheets.   10. Do not allow pets to sleep in your bed with you. The Morning of your surgery:  1. Shower again thoroughly with an antibacterial soap, such as Dial or the soap provided at your preassessment appointment. If needed, ask someone for help to reach all areas of your body. Dont forget to clean your belly button! Rinse. 2. Dry gently with a clean, freshly washed towel. 3. After your shower, do not use any powder, deodorant, perfumes or lotion prior to surgery. 4. Put on clean, freshly washed clothing. Tips to help prevent infections after your surgery:  1. Protect your surgical wound from germs:  ? Hand washing is the most important thing you and your caregivers can do to prevent infections. ? Keep your bandage clean and dry! ? Do not touch your surgical wound. 2. Use clean, freshly washed towels and washcloths every time you shower; do not share bath linens with others. 3. Until your surgical wound is healed, wear clothing and sleep on bed linens each day that are clean and freshly washed. 4. Do not allow pets to sleep in your bed with you or touch your surgical wound. 5. Do not smoke - smoking delays wound healing. This may be a good time to stop smoking. 6. If you have diabetes, it is important for you to manage your blood sugar levels properly before your surgery as well as after your surgery. Poorly managed blood sugar levels slow down wound healing and prevent you from healing completely. If you lose your Hibiclens/chlorhexidine, please call the [facility], or it is available for purchase at your pharmacy.                ___________________      ___________________      ________________  (Signature of Patient)          (Witness)                   (Date and Time)

## 2022-06-07 LAB
BACTERIA SPEC CULT: NORMAL
BACTERIA SPEC CULT: NORMAL
SERVICE CMNT-IMP: NORMAL
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
## 2023-02-17 ENCOUNTER — OFFICE VISIT (OUTPATIENT)
Dept: INTERNAL MEDICINE CLINIC | Age: 63
End: 2023-02-17
Payer: MEDICAID

## 2023-02-17 VITALS
HEIGHT: 62 IN | DIASTOLIC BLOOD PRESSURE: 82 MMHG | OXYGEN SATURATION: 98 % | WEIGHT: 187.8 LBS | HEART RATE: 60 BPM | RESPIRATION RATE: 15 BRPM | TEMPERATURE: 98.3 F | SYSTOLIC BLOOD PRESSURE: 119 MMHG | BODY MASS INDEX: 34.56 KG/M2

## 2023-02-17 DIAGNOSIS — S67.02XA CRUSHING INJURY OF LEFT THUMB, INITIAL ENCOUNTER: ICD-10-CM

## 2023-02-17 DIAGNOSIS — Z12.31 ENCOUNTER FOR SCREENING MAMMOGRAM FOR MALIGNANT NEOPLASM OF BREAST: ICD-10-CM

## 2023-02-17 DIAGNOSIS — Z23 ENCOUNTER FOR IMMUNIZATION: ICD-10-CM

## 2023-02-17 DIAGNOSIS — R73.09 ELEVATED HEMOGLOBIN A1C: ICD-10-CM

## 2023-02-17 DIAGNOSIS — I10 ESSENTIAL HYPERTENSION: ICD-10-CM

## 2023-02-17 DIAGNOSIS — E78.00 HYPERCHOLESTEROLEMIA: ICD-10-CM

## 2023-02-17 DIAGNOSIS — R22.32 LOCALIZED SWELLING OF LEFT THUMB: ICD-10-CM

## 2023-02-17 DIAGNOSIS — M17.0 OSTEOARTHRITIS OF BOTH KNEES, UNSPECIFIED OSTEOARTHRITIS TYPE: ICD-10-CM

## 2023-02-17 DIAGNOSIS — Z00.00 WELL WOMAN EXAM (NO GYNECOLOGICAL EXAM): Primary | ICD-10-CM

## 2023-02-17 DIAGNOSIS — E55.9 HYPOVITAMINOSIS D: ICD-10-CM

## 2023-02-17 DIAGNOSIS — F32.9 REACTIVE DEPRESSION: ICD-10-CM

## 2023-02-17 RX ORDER — BUPROPION HYDROCHLORIDE 150 MG/1
150 TABLET ORAL
Qty: 30 TABLET | Refills: 3 | Status: SHIPPED | OUTPATIENT
Start: 2023-02-17

## 2023-02-17 RX ORDER — LOSARTAN POTASSIUM 100 MG/1
100 TABLET ORAL DAILY
Qty: 90 TABLET | Refills: 1 | Status: SHIPPED | OUTPATIENT
Start: 2023-02-17

## 2023-02-17 RX ORDER — ATENOLOL 100 MG/1
100 TABLET ORAL DAILY
Qty: 90 TABLET | Refills: 1 | Status: SHIPPED | OUTPATIENT
Start: 2023-02-17

## 2023-02-17 RX ORDER — AMLODIPINE BESYLATE 5 MG/1
5 TABLET ORAL DAILY
Qty: 90 TABLET | Refills: 1 | Status: SHIPPED | OUTPATIENT
Start: 2023-02-17

## 2023-02-17 RX ORDER — ROSUVASTATIN CALCIUM 20 MG/1
20 TABLET, COATED ORAL
Qty: 90 TABLET | Refills: 1 | Status: SHIPPED | OUTPATIENT
Start: 2023-02-17

## 2023-02-17 RX ORDER — SULINDAC 200 MG/1
200 TABLET ORAL
Qty: 180 TABLET | Refills: 1 | Status: SHIPPED | OUTPATIENT
Start: 2023-02-17

## 2023-02-17 RX ORDER — HYDROCHLOROTHIAZIDE 25 MG/1
TABLET ORAL
Qty: 90 TABLET | Refills: 1 | Status: SHIPPED | OUTPATIENT
Start: 2023-02-17

## 2023-02-17 NOTE — PROGRESS NOTES
After obtaining consent, and per orders of Dr. Rehana Sanabria, injection of Tdap given by Fernanda Barnett LPN. Patient instructed to remain in clinic for 20 minutes afterwards, and to report any adverse reaction to me immediately.

## 2023-02-17 NOTE — PROGRESS NOTES
Karmen Denis (: 1960) is a 60 y.o. female, established patient, here for evaluation of the following chief complaint(s):  Chief Complaint   Patient presents with    Physical       Assessment and Plan:       ICD-10-CM ICD-9-CM    1. Well woman exam (no gynecological exam)  Z00.00 V70.0 CBC WITH AUTOMATED DIFF      LIPID PANEL      HEMOGLOBIN A1C WITH EAG      RENAL FUNCTION PANEL      HEPATIC FUNCTION PANEL      HEPATIC FUNCTION PANEL      RENAL FUNCTION PANEL      HEMOGLOBIN A1C WITH EAG      LIPID PANEL      CBC WITH AUTOMATED DIFF      REFERRAL TO OBSTETRICS AND GYNECOLOGY      2. Encounter for screening mammogram for malignant neoplasm of breast  Z12.31 V76.12 [facility] MAMMO BI SCREENING INCL CAD      3. Encounter for immunization  Z23 V03.89 TDAP, BOOSTRIX, (AGE 10 YRS+), IM      4. Essential hypertension  I10 401.9 LIPID PANEL      RENAL FUNCTION PANEL      RENAL FUNCTION PANEL      LIPID PANEL      amLODIPine (NORVASC) 5 mg tablet      atenoloL (TENORMIN) 100 mg tablet      hydroCHLOROthiazide (HYDRODIURIL) 25 mg tablet      losartan (COZAAR) 100 mg tablet      5. Hypercholesterolemia  E78.00 272.0 LIPID PANEL      CK      HEPATIC FUNCTION PANEL      HEPATIC FUNCTION PANEL      CK      LIPID PANEL      rosuvastatin (CRESTOR) 20 mg tablet      6. Elevated hemoglobin A1c  R73.09 790.29 LIPID PANEL      HEMOGLOBIN A1C WITH EAG      RENAL FUNCTION PANEL      HEPATIC FUNCTION PANEL      HEPATIC FUNCTION PANEL      RENAL FUNCTION PANEL      HEMOGLOBIN A1C WITH EAG      LIPID PANEL      7. Hypovitaminosis D  E55.9 268.9 VITAMIN D, 25 HYDROXY      RENAL FUNCTION PANEL      RENAL FUNCTION PANEL      VITAMIN D, 25 HYDROXY      8. Osteoarthritis of both knees, unspecified osteoarthritis type  M17.0 715.96 sulindac (CLINORIL) 200 mg tablet      9. Crushing injury of left thumb, initial encounter--3 wks ago at work--date not specified by pt. S67. 02XA 927.3 XR THUMB LT MIN 2 V      REFERRAL TO ORTHOPEDICS      10. Localized swelling of left thumb--PIP  R22.32 782.2 REFERRAL TO ORTHOPEDICS      11. Reactive depression  F32.9 300.4 REFERRAL TO PSYCHOLOGY      buPROPion XL (WELLBUTRIN XL) 150 mg tablet          Diagnoses #1-3 for Preventive Care/Wellness visit. Due to significant separate problems unrelated to preventive care needs, also addressed following diagnoses/problems at visit as below (diagnoses #4-11 above). 1-2:  Screenings reviewed at visit. 3:  Immunization(s) reviewed and updated at visit. 4-7:  Lab monitoring reviewed. 4-8:  Refills and mgt reviewed. 9-10:  Xray and referral reviewed. Reviewed she should review eval with work in case needs to do at specific location with workman's comp. 11:  Reviewed depression mgt end of visit. Follow-up and Dispositions    Return in about 4 months (around 6/17/2023) for medication follow-up, Blood Pressure follow-up.       lab results and schedule of future lab studies reviewed with patient  reviewed medications and side effects in detail    For additional documentation of information and/or recommendations discussed this visit, please see notes in instructions. Plan and evaluation (above) reviewed with pt at visit  Patient voiced understanding of plan and provided with time to ask/review questions. After Visit Summary (AVS) provided to pt after visit with additional instructions as needed/reviewed. Future Appointments   Date Time Provider Williams Barone   3/23/2023  7:30 AM [facility] STEREO 1 [facility] REG   5/2/2023 10:00 AM Maria M Christiansen MD [facility] BS AMB   6/19/2023 11:00 AM Los Young MD [facility] AMB   --Updated future visits after patient check-out. History of Present Illness:     Notes (nursing/rooming note copied below in italics):  As above    Here for BP follow-up. LOV May 2022 with labs. Missed interim appts x 2 since then.     Health Maintenance Due   Topic Date Due    Cervical cancer screen 05/30/2016    Colorectal Cancer Screening Combo  08/24/2020    DTaP/Tdap/Td series (2 - Td or Tdap) 10/04/2022     --COVID and influenza updated from [address] ([place]) form retrieved/reviewed. --She had colonscopy in past.  Due now and has not scheduled from prior refereral--reprinted today. --Last mammogram 2021--she is interested in updating screening. --Notes pap not done recently--gyn referral reviewed. Nursing screenings reviewed by provider at visit. Prior to Admission medications    Medication Sig Start Date End Date Taking? Authorizing Provider   sulindac (CLINORIL) 200 mg tablet Take 1 Tablet by mouth two (2) times daily as needed for Pain. Take instead of BC powder or other NSAIDs (ibuprofen, naprosyn). 4/28/22  Yes Miles Lange MD   rosuvastatin (CRESTOR) 20 mg tablet Take 1 Tablet by mouth nightly. 4/28/22  Yes Miles Lange MD   amLODIPine (NORVASC) 5 mg tablet Take 1 Tablet by mouth daily. Patient taking differently: Take 5 mg by mouth nightly. 4/28/22   Miles Lange MD   atenoloL (TENORMIN) 100 mg tablet Take 1 Tablet by mouth daily. Patient taking differently: Take 100 mg by mouth nightly. 4/28/22   Miles Lange MD   hydroCHLOROthiazide (HYDRODIURIL) 25 mg tablet TAKE 1 TABLET DAILY  Patient taking differently: nightly. TAKE 1 TABLET DAILY 4/28/22   Miles Lange MD   losartan (COZAAR) 100 mg tablet Take 1 Tablet by mouth daily. Patient taking differently: Take 100 mg by mouth nightly. 4/28/22   Miles Lange MD        ROS    Vitals:    02/17/23 1130   BP: 119/82   Pulse: 60   Resp: 15   Temp: 98.3 °F (36.8 °C)   TempSrc: Oral   SpO2: 98%   Weight: 187 lb 12.8 oz (85.2 kg)   Height: 5' 2\" (1.575 m)   PainSc:   9   PainLoc: Hand      Body mass index is 34.35 kg/m². Physical Exam:     Physical Exam  Vitals and nursing note reviewed.    Constitutional:       General: She is not in acute distress. Appearance: Normal appearance. She is well-developed. She is not diaphoretic. HENT:      Head: Normocephalic and atraumatic. Mouth/Throat:      Mouth: Mucous membranes are moist.   Eyes:      General: No scleral icterus. Right eye: No discharge. Left eye: No discharge. Conjunctiva/sclera: Conjunctivae normal.   Cardiovascular:      Rate and Rhythm: Normal rate and regular rhythm. Pulses: Normal pulses. Heart sounds: Normal heart sounds. No murmur heard. No friction rub. No gallop. Pulmonary:      Effort: Pulmonary effort is normal. No respiratory distress. Breath sounds: Normal breath sounds. No stridor. No wheezing, rhonchi or rales. Abdominal:      General: Bowel sounds are normal. There is no distension. Palpations: Abdomen is soft. Tenderness: There is no abdominal tenderness. There is no guarding. Musculoskeletal:         General: No swelling, tenderness, deformity or signs of injury. Cervical back: Neck supple. No rigidity. No muscular tenderness. Right lower leg: No edema. Left lower leg: No edema. Comments: Swelling and localized pain left thumb IP joint. Lymphadenopathy:      Cervical: No cervical adenopathy. Skin:     General: Skin is warm. Coloration: Skin is not jaundiced or pale. Findings: No bruising, erythema or rash. Neurological:      General: No focal deficit present. Mental Status: She is alert. Motor: No abnormal muscle tone. Coordination: Coordination normal.      Gait: Gait normal.   Psychiatric:         Mood and Affect: Mood normal.         Behavior: Behavior normal.         Thought Content: Thought content normal.         Judgment: Judgment normal.       An electronic signature was used to authenticate this note.   -- Andrade Trevizo MD

## 2023-02-17 NOTE — PATIENT INSTRUCTIONS
Urgent Care Orthopedic Referral Locations:  Ar[facility] Alonso--Ortho On Call      [facility], [address]  [place], [address] [phone]    Hours  Monday - Friday 5:30 pm - 9:00 pm  Saturday 8:00 am - noon    [place]  [address]  [phone]([phone])    Hours  Monday-Saturday 9:00 am - 9:00 pm  Sunday 11:00 am - 5:00 pm    [place]  [address]  [phone]    Hours  Monday - Saturday 9:00 am - 9:00 pm  Sunday 11:00 am - 5:00 pm    Visit website: [email or web address]

## 2023-02-17 NOTE — PROGRESS NOTES
Rm: 16      Chief Complaint   Patient presents with    Physical       Visit Vitals  /82 (BP 1 Location: Left upper arm, BP Patient Position: Sitting, BP Cuff Size: Adult)   Pulse 60   Temp 98.3 °F (36.8 °C) (Oral)   Resp 15   Ht 5' 2\" (1.575 m)   Wt 187 lb 12.8 oz (85.2 kg)   SpO2 98%   BMI 34.35 kg/m²       1. Have you been to the ER, urgent care clinic since your last visit? Hospitalized since your last visit? Yes Where: [facility]     2. Have you seen or consulted any other health care providers outside of the [address] [place] [address] since your last visit? Include any pap smears or colon screening.  Yes Where: Ortho

## 2023-02-20 LAB
25(OH)D3+25(OH)D2 SERPL-MCNC: 24.4 NG/ML (ref 30–100)
ALBUMIN SERPL-MCNC: 3.8 G/DL (ref 3.8–4.8)
ALP SERPL-CCNC: 100 IU/L (ref 44–121)
ALT SERPL-CCNC: 11 IU/L (ref 0–32)
AST SERPL-CCNC: 16 IU/L (ref 0–40)
BASOPHILS # BLD AUTO: 0 X10E3/UL (ref 0–0.2)
BASOPHILS NFR BLD AUTO: 0 %
BILIRUB DIRECT SERPL-MCNC: 0.15 MG/DL (ref 0–0.4)
BILIRUB SERPL-MCNC: 0.5 MG/DL (ref 0–1.2)
BUN SERPL-MCNC: 25 MG/DL (ref 8–27)
BUN/CREAT SERPL: 16 (ref 12–28)
CALCIUM SERPL-MCNC: 9.3 MG/DL (ref 8.7–10.3)
CHLORIDE SERPL-SCNC: 105 MMOL/L (ref 96–106)
CHOLEST SERPL-MCNC: 197 MG/DL (ref 100–199)
CK SERPL-CCNC: 65 U/L (ref 32–182)
CO2 SERPL-SCNC: 22 MMOL/L (ref 20–29)
CREAT SERPL-MCNC: 1.56 MG/DL (ref 0.57–1)
EOSINOPHIL # BLD AUTO: 0.2 X10E3/UL (ref 0–0.4)
EOSINOPHIL NFR BLD AUTO: 2 %
ERYTHROCYTE [DISTWIDTH] IN BLOOD BY AUTOMATED COUNT: 12.9 % (ref 11.7–15.4)
EST. AVERAGE GLUCOSE BLD GHB EST-MCNC: 123 MG/DL
GLUCOSE SERPL-MCNC: 97 MG/DL (ref 70–99)
HBA1C MFR BLD: 5.9 % (ref 4.8–5.6)
HCT VFR BLD AUTO: 36.5 % (ref 34–46.6)
HDLC SERPL-MCNC: 49 MG/DL
HGB BLD-MCNC: 11.9 G/DL (ref 11.1–15.9)
IMM GRANULOCYTES # BLD AUTO: 0 X10E3/UL (ref 0–0.1)
IMM GRANULOCYTES NFR BLD AUTO: 0 %
LDLC SERPL CALC-MCNC: 123 MG/DL (ref 0–99)
LYMPHOCYTES # BLD AUTO: 2.1 X10E3/UL (ref 0.7–3.1)
LYMPHOCYTES NFR BLD AUTO: 30 %
MCH RBC QN AUTO: 30.4 PG (ref 26.6–33)
MCHC RBC AUTO-ENTMCNC: 32.6 G/DL (ref 31.5–35.7)
MCV RBC AUTO: 93 FL (ref 79–97)
MONOCYTES # BLD AUTO: 0.5 X10E3/UL (ref 0.1–0.9)
MONOCYTES NFR BLD AUTO: 7 %
NEUTROPHILS # BLD AUTO: 4.2 X10E3/UL (ref 1.4–7)
NEUTROPHILS NFR BLD AUTO: 61 %
PHOSPHATE SERPL-MCNC: 3.4 MG/DL (ref 3–4.3)
PLATELET # BLD AUTO: 432 X10E3/UL (ref 150–450)
POTASSIUM SERPL-SCNC: 4.3 MMOL/L (ref 3.5–5.2)
PROT SERPL-MCNC: 6.7 G/DL (ref 6–8.5)
RBC # BLD AUTO: 3.91 X10E6/UL (ref 3.77–5.28)
SODIUM SERPL-SCNC: 143 MMOL/L (ref 134–144)
TRIGL SERPL-MCNC: 140 MG/DL (ref 0–149)
VLDLC SERPL CALC-MCNC: 25 MG/DL (ref 5–40)
WBC # BLD AUTO: 6.9 X10E3/UL (ref 3.4–10.8)

## 2023-02-27 ENCOUNTER — TELEPHONE (OUTPATIENT)
Dept: INTERNAL MEDICINE CLINIC | Age: 63
End: 2023-02-27

## 2023-03-23 ENCOUNTER — HOSPITAL ENCOUNTER (OUTPATIENT)
Dept: MAMMOGRAPHY | Age: 63
Discharge: HOME | End: 2023-03-23
Attending: INTERNAL MEDICINE
Payer: MEDICAID

## 2023-03-23 DIAGNOSIS — Z12.31 ENCOUNTER FOR SCREENING MAMMOGRAM FOR MALIGNANT NEOPLASM OF BREAST: ICD-10-CM

## 2023-03-23 PROCEDURE — 77067 SCR MAMMO BI INCL CAD: CPT

## 2023-05-02 ENCOUNTER — OFFICE VISIT (OUTPATIENT)
Dept: OBGYN CLINIC | Age: 63
End: 2023-05-02
Payer: MEDICAID

## 2023-05-02 VITALS — WEIGHT: 184.2 LBS | BODY MASS INDEX: 33.69 KG/M2 | SYSTOLIC BLOOD PRESSURE: 120 MMHG | DIASTOLIC BLOOD PRESSURE: 64 MMHG

## 2023-05-02 DIAGNOSIS — Z01.419 WELL WOMAN EXAM WITH ROUTINE GYNECOLOGICAL EXAM: Primary | ICD-10-CM

## 2023-05-02 DIAGNOSIS — Z11.51 ENCOUNTER FOR SCREENING FOR HUMAN PAPILLOMAVIRUS (HPV): ICD-10-CM

## 2023-05-02 DIAGNOSIS — N63.11 MASS OF UPPER OUTER QUADRANT OF RIGHT BREAST: ICD-10-CM

## 2023-05-02 DIAGNOSIS — D21.9 FIBROIDS: ICD-10-CM

## 2023-05-02 PROCEDURE — 3078F DIAST BP <80 MM HG: CPT | Performed by: OBSTETRICS & GYNECOLOGY

## 2023-05-02 PROCEDURE — 3074F SYST BP LT 130 MM HG: CPT | Performed by: OBSTETRICS & GYNECOLOGY

## 2023-05-02 PROCEDURE — 99386 PREV VISIT NEW AGE 40-64: CPT | Performed by: OBSTETRICS & GYNECOLOGY

## 2023-05-16 PROBLEM — R87.610 ASCUS OF CERVIX WITH NEGATIVE HIGH RISK HPV: Status: ACTIVE | Noted: 2023-05-02

## 2023-05-30 NOTE — TELEPHONE ENCOUNTER
Pt left a voice message requesting a refill. Pt stated that had lost the medication and would like a return call. [phone]    Last appointment: 02/17/2023 MD Yolanda Hicks   Next appointment: 06/22/2023 MD Yolanda Hicks   Previous refill encounter(s):   02/17/2023 Wellbutrin-XL #30 with 3 refills.      For Pharmacy Admin Tracking Only    Program: Medication Refill  Intervention Detail: New Rx: 1, reason: Patient Preference  Time Spent (min): 5      Requested Prescriptions     Pending Prescriptions Disp Refills    buPROPion (WELLBUTRIN XL) 150 MG extended release tablet 30 tablet 0     Sig: Take 1 tablet by mouth every morning

## 2023-05-31 NOTE — PROGRESS NOTES
palpable  Hernias: no hernias identified    Genitourinary  External Genitalia: normal appearance for age, no discharge present, no tenderness present, no inflammatory lesions present, no masses present, no atrophy present  Vagina: normal vaginal vault without central or paravaginal defects, no discharge present, no inflammatory lesions present, no masses present  Bladder: non-tender to palpation  Urethra: appears normal  Cervix: normal   Uterus: normal size, shape and consistency  Adnexa: no adnexal tenderness present, no adnexal masses present  Perineum: perineum within normal limits, no evidence of trauma, no rashes or skin lesions present    Skin  General Inspection: no rash, no lesions identified    Neurologic/Psychiatric  Mental Status:  Orientation: grossly oriented to person, place and time  Mood and Affect: mood normal, affect appropriate      Assessment/Plan:  60 y.o. presenting for US follow up of fibroids and h/o ovarian cysts. +deep dyspareunia. Pt also with constipation. And has not followed up for prior right breast mass. -reviewed US findings with pt today, reassurance of normal appearance of ovaries, 2 small fibroids, only finding of significance was cystic structures in endometrium and myometrium, suspect related to prior endometrial ablation, but must exclude hyperplasia/malignancy, EMS 6.8mm  -EMBx performed today, pathology pending --> plan pending results  -again provided referral to DR ROBBIE MAGAÑA [facility]  -advised bowel regimen and has upcoming colonoscopy scheduled    RTC 3 months for follow up and repeat Raymond Levine MD  6/1/2023  10:29 AM     Procedure note: Endometrial biopsy    Abelino Santos is a No obstetric history on file. ,  60 y.o. female Black /  No LMP recorded.  Patient is postmenopausal.  The patient has a history of The primary encounter diagnosis was Mass of upper outer quadrant of right breast. A diagnosis of Endometrial thickening on ultrasound was also pertinent to this

## 2023-06-01 ENCOUNTER — OFFICE VISIT (OUTPATIENT)
Age: 63
End: 2023-06-01

## 2023-06-01 VITALS — DIASTOLIC BLOOD PRESSURE: 72 MMHG | SYSTOLIC BLOOD PRESSURE: 120 MMHG | BODY MASS INDEX: 33.84 KG/M2 | WEIGHT: 185 LBS

## 2023-06-01 DIAGNOSIS — R93.89 ENDOMETRIAL THICKENING ON ULTRASOUND: ICD-10-CM

## 2023-06-01 DIAGNOSIS — N63.11 MASS OF UPPER OUTER QUADRANT OF RIGHT BREAST: Primary | ICD-10-CM

## 2023-06-02 ENCOUNTER — OFFICE VISIT (OUTPATIENT)
Age: 63
End: 2023-06-02
Payer: MEDICAID

## 2023-06-02 DIAGNOSIS — Z98.890 S/P BILATERAL BREAST REDUCTION: Primary | ICD-10-CM

## 2023-06-02 PROCEDURE — 76642 ULTRASOUND BREAST LIMITED: CPT | Performed by: SURGERY

## 2023-06-02 PROCEDURE — 99203 OFFICE O/P NEW LOW 30 MIN: CPT | Performed by: SURGERY

## 2023-06-04 RX ORDER — BUPROPION HYDROCHLORIDE 150 MG/1
150 TABLET ORAL EVERY MORNING
Qty: 30 TABLET | Refills: 3 | Status: SHIPPED | OUTPATIENT
Start: 2023-06-04

## 2023-06-05 LAB
CPT BILLING CODE: NORMAL
DIAGNOSIS SYNOPSIS:: NORMAL
DX ICD CODE: NORMAL
PATH REPORT.FINAL DX SPEC: NORMAL
PATH REPORT.GROSS SPEC: NORMAL
PATH REPORT.RELEVANT HX SPEC: NORMAL
PATH REPORT.SITE OF ORIGIN SPEC: NORMAL
PATHOLOGIST NAME: NORMAL
PAYMENT PROCEDURE: NORMAL

## 2023-08-09 ENCOUNTER — TELEPHONE (OUTPATIENT)
Age: 63
End: 2023-08-09

## 2023-08-09 NOTE — TELEPHONE ENCOUNTER
Patient calling name and  verified. Patient states she received letter. Gave below results and  scheduled for ultrasound and follow up appointment 2023 0730 us 0800 appointment. Flex Reinoso MD   2023  5:21 PM EDT       EMBx insufficient. Fragments of benign endocervical glands, no diagnostic endometrial tissue. Given EMS just slightly thickened at 6.8mm, no jordin vaginal bleeding, and h/o endometrial ablation, low suspicion for malignancy/hyperplasia, suspect cystic structures dt prior ablation history. Will plan for close follow up. If any vaginal bleeding will need to go to OR for surgical endometrial sampling. Please advise repeat pelvic US in 3 months. Please inform pt and help arrange.  Thank you

## 2023-08-14 NOTE — TELEPHONE ENCOUNTER
[address] [facility] is following up on fax requests. Please review. ----------------------------------------------------  Please contact patient for scheduling. Thanks    Last appointment: 02/17/2023 MD Renan Reyes   Next appointment: No show 07/10/2023     - Nothing rescheduled   Previous refill encounter(s):   02/17/2023:  - Cozaar #90 with 1 refill,   - HCTZ #90 with 1 refill,   - Tenormin #90 with 1 refill,   - Norvasc #90 with 1 refill.      For Pharmacy Admin Tracking Only    Program: Medication Refill  Intervention Detail: New Rx: 4, reason: Patient Preference  Time Spent (min): 5      Requested Prescriptions     Pending Prescriptions Disp Refills    amLODIPine (NORVASC) 5 MG tablet [Pharmacy Med Name: amLODIPine Besylate 5 MG Oral Tablet] 90 tablet 0     Sig: Take 1 tablet by mouth once daily    atenolol (TENORMIN) 100 MG tablet [Pharmacy Med Name: Atenolol 100 MG Oral Tablet] 90 tablet 0     Sig: Take 1 tablet by mouth once daily    hydroCHLOROthiazide (HYDRODIURIL) 25 MG tablet [Pharmacy Med Name: hydroCHLOROthiazide 25 MG Oral Tablet] 90 tablet 0     Sig: Take 1 tablet by mouth once daily    losartan (COZAAR) 100 MG tablet [Pharmacy Med Name: Losartan Potassium 100 MG Oral Tablet] 90 tablet 0     Sig: Take 1 tablet by mouth once daily

## 2023-08-14 NOTE — TELEPHONE ENCOUNTER
Please contact patient for scheduling. Thanks     Last appointment: 02/17/2023 MD Chidi Cota   Next appointment: No show 07/10/2023     - Nothing rescheduled   Previous refill encounter(s):   02/17/2023 Crestor #90 with 1 refill.      For Pharmacy Admin Tracking Only    Program: Medication Refill  Intervention Detail: New Rx: 1, reason: Patient Preference  Time Spent (min): 5      Requested Prescriptions     Pending Prescriptions Disp Refills    rosuvastatin (CRESTOR) 20 MG tablet [Pharmacy Med Name: Rosuvastatin Calcium 20 MG Oral Tablet] 90 tablet 0     Sig: Take 1 tablet by mouth nightly

## 2023-08-21 RX ORDER — HYDROCHLOROTHIAZIDE 25 MG/1
TABLET ORAL
Qty: 90 TABLET | Refills: 0 | Status: SHIPPED | OUTPATIENT
Start: 2023-08-21

## 2023-08-21 RX ORDER — ATENOLOL 100 MG/1
TABLET ORAL
Qty: 90 TABLET | Refills: 1 | Status: SHIPPED | OUTPATIENT
Start: 2023-08-21

## 2023-08-21 RX ORDER — AMLODIPINE BESYLATE 5 MG/1
TABLET ORAL
Qty: 90 TABLET | Refills: 1 | Status: SHIPPED | OUTPATIENT
Start: 2023-08-21

## 2023-08-21 RX ORDER — ROSUVASTATIN CALCIUM 20 MG/1
TABLET, COATED ORAL
Qty: 90 TABLET | Refills: 0 | Status: SHIPPED | OUTPATIENT
Start: 2023-08-21

## 2023-08-21 RX ORDER — LOSARTAN POTASSIUM 100 MG/1
TABLET ORAL
Qty: 90 TABLET | Refills: 0 | Status: SHIPPED | OUTPATIENT
Start: 2023-08-21

## 2023-08-22 NOTE — TELEPHONE ENCOUNTER
Refill request(s) approved--HCTZ, losartan--90-day supply with 0 refill(s). Refill protocol details (computer-generated) reviewed, as available. Refill request(s) approved--amlodipine, atenolol. Future Appointments   Date Time Provider [address]   9/8/2023  7:30 AM RADHA Catherine   9/8/2023  8:00 AM MD FAM Newell     MyChart message to pt--to schedule follow-up appt.

## 2023-08-22 NOTE — TELEPHONE ENCOUNTER
Refill request(s) approved--rosuvastatin--90-day supply with 0 refill(s). Refill protocol details (computer-generated) reviewed, as available.

## 2023-10-02 ENCOUNTER — OFFICE VISIT (OUTPATIENT)
Age: 63
End: 2023-10-02
Payer: MEDICAID

## 2023-10-02 VITALS — WEIGHT: 184.2 LBS | BODY MASS INDEX: 33.69 KG/M2 | SYSTOLIC BLOOD PRESSURE: 124 MMHG | DIASTOLIC BLOOD PRESSURE: 82 MMHG

## 2023-10-02 DIAGNOSIS — R93.89 THICKENED ENDOMETRIUM: Primary | ICD-10-CM

## 2023-10-02 DIAGNOSIS — N95.0 POSTMENOPAUSAL BLEEDING: ICD-10-CM

## 2023-10-02 DIAGNOSIS — D21.9 FIBROIDS: ICD-10-CM

## 2023-10-02 PROCEDURE — 3079F DIAST BP 80-89 MM HG: CPT | Performed by: OBSTETRICS & GYNECOLOGY

## 2023-10-02 PROCEDURE — 99214 OFFICE O/P EST MOD 30 MIN: CPT | Performed by: OBSTETRICS & GYNECOLOGY

## 2023-10-02 PROCEDURE — 3074F SYST BP LT 130 MM HG: CPT | Performed by: OBSTETRICS & GYNECOLOGY

## 2023-10-30 ENCOUNTER — PREP FOR PROCEDURE (OUTPATIENT)
Facility: HOSPITAL | Age: 63
End: 2023-10-30

## 2023-10-30 DIAGNOSIS — R93.89 THICKENED ENDOMETRIUM: ICD-10-CM

## 2023-10-30 DIAGNOSIS — N95.0 POSTMENOPAUSAL BLEEDING: Primary | ICD-10-CM

## 2023-11-21 ENCOUNTER — HOSPITAL ENCOUNTER (OUTPATIENT)
Facility: HOSPITAL | Age: 63
Discharge: HOME | End: 2023-11-24
Payer: MEDICAID

## 2023-11-21 VITALS
WEIGHT: 190 LBS | BODY MASS INDEX: 34.96 KG/M2 | DIASTOLIC BLOOD PRESSURE: 85 MMHG | TEMPERATURE: 98.5 F | HEART RATE: 58 BPM | HEIGHT: 62 IN | SYSTOLIC BLOOD PRESSURE: 143 MMHG

## 2023-11-21 DIAGNOSIS — N95.0 POSTMENOPAUSAL BLEEDING: ICD-10-CM

## 2023-11-21 DIAGNOSIS — R93.89 THICKENED ENDOMETRIUM: ICD-10-CM

## 2023-11-21 LAB
25(OH)D3+25(OH)D2 SERPL-MCNC: 18.8 NG/ML (ref 30–100)
ABO + RH BLD: NORMAL
ALBUMIN SERPL-MCNC: 4.4 G/DL (ref 3.9–4.9)
ALP SERPL-CCNC: 122 IU/L (ref 44–121)
ALT SERPL-CCNC: 11 IU/L (ref 0–32)
AST SERPL-CCNC: 16 IU/L (ref 0–40)
BASOPHILS # BLD AUTO: 0 X10E3/UL (ref 0–0.2)
BASOPHILS NFR BLD AUTO: 0 %
BILIRUB DIRECT SERPL-MCNC: 0.2 MG/DL (ref 0–0.4)
BILIRUB SERPL-MCNC: 0.5 MG/DL (ref 0–1.2)
BLOOD GROUP ANTIBODIES SERPL: NORMAL
BUN SERPL-MCNC: 26 MG/DL (ref 8–27)
BUN/CREAT SERPL: 16 (ref 12–28)
CALCIUM SERPL-MCNC: 10 MG/DL (ref 8.7–10.3)
CHLORIDE SERPL-SCNC: 102 MMOL/L (ref 96–106)
CHOLEST SERPL-MCNC: 278 MG/DL (ref 100–199)
CK SERPL-CCNC: 84 U/L (ref 32–182)
CO2 SERPL-SCNC: 25 MMOL/L (ref 20–29)
CREAT SERPL-MCNC: 1.61 MG/DL (ref 0.57–1)
EKG ATRIAL RATE: 53 BPM
EKG DIAGNOSIS: NORMAL
EKG P AXIS: 21 DEGREES
EKG P-R INTERVAL: 162 MS
EKG Q-T INTERVAL: 466 MS
EKG QRS DURATION: 80 MS
EKG QTC CALCULATION (BAZETT): 437 MS
EKG R AXIS: 17 DEGREES
EKG T AXIS: 22 DEGREES
EKG VENTRICULAR RATE: 53 BPM
EOSINOPHIL # BLD AUTO: 0.3 X10E3/UL (ref 0–0.4)
EOSINOPHIL NFR BLD AUTO: 4 %
ERYTHROCYTE [DISTWIDTH] IN BLOOD BY AUTOMATED COUNT: 11.8 % (ref 11.7–15.4)
EST. AVERAGE GLUCOSE BLD GHB EST-MCNC: 120 MG/DL
GLUCOSE SERPL-MCNC: 104 MG/DL (ref 70–99)
HBA1C MFR BLD: 5.8 % (ref 4.8–5.6)
HCT VFR BLD AUTO: 40.2 % (ref 34–46.6)
HDLC SERPL-MCNC: 62 MG/DL
HGB BLD-MCNC: 13.5 G/DL (ref 11.1–15.9)
IMM GRANULOCYTES # BLD AUTO: 0 X10E3/UL (ref 0–0.1)
IMM GRANULOCYTES NFR BLD AUTO: 0 %
LABORATORY COMMENT REPORT: ABNORMAL
LDLC SERPL CALC-MCNC: 190 MG/DL (ref 0–99)
LYMPHOCYTES # BLD AUTO: 1.8 X10E3/UL (ref 0.7–3.1)
LYMPHOCYTES NFR BLD AUTO: 24 %
MCH RBC QN AUTO: 32.4 PG (ref 26.6–33)
MCHC RBC AUTO-ENTMCNC: 33.6 G/DL (ref 31.5–35.7)
MCV RBC AUTO: 96 FL (ref 79–97)
MONOCYTES # BLD AUTO: 0.6 X10E3/UL (ref 0.1–0.9)
MONOCYTES NFR BLD AUTO: 7 %
NEUTROPHILS # BLD AUTO: 4.9 X10E3/UL (ref 1.4–7)
NEUTROPHILS NFR BLD AUTO: 65 %
PHOSPHATE SERPL-MCNC: 3.9 MG/DL (ref 3–4.3)
PLATELET # BLD AUTO: 322 X10E3/UL (ref 150–450)
POTASSIUM SERPL-SCNC: 4.5 MMOL/L (ref 3.5–5.2)
PROT SERPL-MCNC: 7.3 G/DL (ref 6–8.5)
RBC # BLD AUTO: 4.17 X10E6/UL (ref 3.77–5.28)
SODIUM SERPL-SCNC: 141 MMOL/L (ref 134–144)
SPECIMEN EXP DATE BLD: NORMAL
TRIGL SERPL-MCNC: 142 MG/DL (ref 0–149)
VLDLC SERPL CALC-MCNC: 26 MG/DL (ref 5–40)
WBC # BLD AUTO: 7.6 X10E3/UL (ref 3.4–10.8)

## 2023-11-21 PROCEDURE — 93010 ELECTROCARDIOGRAM REPORT: CPT | Performed by: INTERNAL MEDICINE

## 2023-11-21 PROCEDURE — 36415 COLL VENOUS BLD VENIPUNCTURE: CPT

## 2023-11-21 PROCEDURE — 86850 RBC ANTIBODY SCREEN: CPT

## 2023-11-21 PROCEDURE — 93005 ELECTROCARDIOGRAM TRACING: CPT | Performed by: OBSTETRICS & GYNECOLOGY

## 2023-11-21 PROCEDURE — 86901 BLOOD TYPING SEROLOGIC RH(D): CPT

## 2023-11-21 PROCEDURE — 86900 BLOOD TYPING SEROLOGIC ABO: CPT

## 2023-11-21 RX ORDER — SULINDAC 150 MG/1
150 TABLET ORAL AS NEEDED
COMMUNITY

## 2023-11-21 NOTE — PERIOP NOTE
[address] INSTRUCTIONS    Surgery Date:   11/27/23    Your surgeon's office or [facility] staff will call you between 4 PM- 8 PM the day before surgery with your arrival time. If your surgery is on a Monday, you will receive a call the preceding Friday. If your surgeon;s office has given you arrival time, then go by that time. Please report to [facility] Patient Access/Admitting on the 1st floor. Bring your insurance card, photo identification, and any copayment ( if applicable). If you are going home the same day of your surgery, you must have a responsible adult to drive you home. You need to have a responsible adult to stay with you the first 24 hours after surgery and you should not drive a car for 24 hours following your surgery. If you are being admitted to the hospital, please leave personal belongings/luggage in your car until you have an assigned hospital room number. Nothing to eat or drink after midnight the night before surgery. This includes no water, gum, mints, coffee, juice, etc.  Please note special instructions, if applicable, below for medications. Do NOT drink alcohol or smoke 24 hours before surgery. STOP smoking for 14 days prior as it helps with breathing and healing after surgery. Please wear comfortable clothes. Wear glasses instead of contacts. We ask that all money and jewelry and valuables to be left at home. Wear no makeup, particularly mascara the day of surgery. All body piercings, rings, and jewelry need to be removed and left at home. Remove all nail polish except for clear. Please wear your hair loose or down. Please no pony-tails, buns, or any metal hair accessories. You may wear deodorant, unless having breast surgery. Do not shave any body area within 24 hours of your surgery. Please follow all instructions to avoid any potential surgical cancellation.   Should your physical condition change, (i.e. fever, cold, flu, etc.) please notify your

## 2023-11-27 ENCOUNTER — ANESTHESIA EVENT (OUTPATIENT)
Facility: HOSPITAL | Age: 63
End: 2023-11-27
Payer: MEDICAID

## 2023-11-27 ENCOUNTER — HOSPITAL ENCOUNTER (OUTPATIENT)
Facility: HOSPITAL | Age: 63
Setting detail: OUTPATIENT SURGERY
Discharge: HOME | End: 2023-11-27
Attending: OBSTETRICS & GYNECOLOGY | Admitting: OBSTETRICS & GYNECOLOGY
Payer: MEDICAID

## 2023-11-27 ENCOUNTER — ANESTHESIA (OUTPATIENT)
Facility: HOSPITAL | Age: 63
End: 2023-11-27
Payer: MEDICAID

## 2023-11-27 VITALS
TEMPERATURE: 97.9 F | HEIGHT: 62 IN | HEART RATE: 70 BPM | DIASTOLIC BLOOD PRESSURE: 77 MMHG | RESPIRATION RATE: 16 BRPM | OXYGEN SATURATION: 96 % | SYSTOLIC BLOOD PRESSURE: 122 MMHG | BODY MASS INDEX: 34.97 KG/M2 | WEIGHT: 190.04 LBS

## 2023-11-27 DIAGNOSIS — N95.0 POSTMENOPAUSAL BLEEDING: ICD-10-CM

## 2023-11-27 DIAGNOSIS — R93.89 THICKENED ENDOMETRIUM: ICD-10-CM

## 2023-11-27 PROCEDURE — 3600000004 HC SURGERY LEVEL 4 BASE: Performed by: OBSTETRICS & GYNECOLOGY

## 2023-11-27 PROCEDURE — 2500000003 HC RX 250 WO HCPCS: Performed by: NURSE ANESTHETIST, CERTIFIED REGISTERED

## 2023-11-27 PROCEDURE — 2709999900 HC NON-CHARGEABLE SUPPLY: Performed by: OBSTETRICS & GYNECOLOGY

## 2023-11-27 PROCEDURE — 7100000011 HC PHASE II RECOVERY - ADDTL 15 MIN: Performed by: OBSTETRICS & GYNECOLOGY

## 2023-11-27 PROCEDURE — 58558 HYSTEROSCOPY BIOPSY: CPT | Performed by: OBSTETRICS & GYNECOLOGY

## 2023-11-27 PROCEDURE — 7100000000 HC PACU RECOVERY - FIRST 15 MIN: Performed by: OBSTETRICS & GYNECOLOGY

## 2023-11-27 PROCEDURE — 7100000010 HC PHASE II RECOVERY - FIRST 15 MIN: Performed by: OBSTETRICS & GYNECOLOGY

## 2023-11-27 PROCEDURE — 3600000014 HC SURGERY LEVEL 4 ADDTL 15MIN: Performed by: OBSTETRICS & GYNECOLOGY

## 2023-11-27 PROCEDURE — 3700000000 HC ANESTHESIA ATTENDED CARE: Performed by: OBSTETRICS & GYNECOLOGY

## 2023-11-27 PROCEDURE — 6360000002 HC RX W HCPCS: Performed by: NURSE ANESTHETIST, CERTIFIED REGISTERED

## 2023-11-27 PROCEDURE — 88305 TISSUE EXAM BY PATHOLOGIST: CPT

## 2023-11-27 PROCEDURE — 2580000003 HC RX 258: Performed by: ANESTHESIOLOGY

## 2023-11-27 PROCEDURE — 3700000001 HC ADD 15 MINUTES (ANESTHESIA): Performed by: OBSTETRICS & GYNECOLOGY

## 2023-11-27 PROCEDURE — 7100000001 HC PACU RECOVERY - ADDTL 15 MIN: Performed by: OBSTETRICS & GYNECOLOGY

## 2023-11-27 RX ORDER — HYDRALAZINE HYDROCHLORIDE 20 MG/ML
10 INJECTION INTRAMUSCULAR; INTRAVENOUS
Status: DISCONTINUED | OUTPATIENT
Start: 2023-11-27 | End: 2023-11-27 | Stop reason: HOSPADM

## 2023-11-27 RX ORDER — DIPHENHYDRAMINE HYDROCHLORIDE 50 MG/ML
12.5 INJECTION INTRAMUSCULAR; INTRAVENOUS
Status: DISCONTINUED | OUTPATIENT
Start: 2023-11-27 | End: 2023-11-27 | Stop reason: HOSPADM

## 2023-11-27 RX ORDER — LIDOCAINE HYDROCHLORIDE 20 MG/ML
INJECTION, SOLUTION EPIDURAL; INFILTRATION; INTRACAUDAL; PERINEURAL PRN
Status: DISCONTINUED | OUTPATIENT
Start: 2023-11-27 | End: 2023-11-27 | Stop reason: SDUPTHER

## 2023-11-27 RX ORDER — DEXAMETHASONE SODIUM PHOSPHATE 4 MG/ML
INJECTION, SOLUTION INTRA-ARTICULAR; INTRALESIONAL; INTRAMUSCULAR; INTRAVENOUS; SOFT TISSUE PRN
Status: DISCONTINUED | OUTPATIENT
Start: 2023-11-27 | End: 2023-11-27 | Stop reason: SDUPTHER

## 2023-11-27 RX ORDER — FENTANYL CITRATE 50 UG/ML
100 INJECTION, SOLUTION INTRAMUSCULAR; INTRAVENOUS
Status: DISCONTINUED | OUTPATIENT
Start: 2023-11-27 | End: 2023-11-27 | Stop reason: HOSPADM

## 2023-11-27 RX ORDER — SODIUM CHLORIDE 0.9 % (FLUSH) 0.9 %
5-40 SYRINGE (ML) INJECTION PRN
Status: DISCONTINUED | OUTPATIENT
Start: 2023-11-27 | End: 2023-11-27 | Stop reason: HOSPADM

## 2023-11-27 RX ORDER — LIDOCAINE HYDROCHLORIDE 10 MG/ML
1 INJECTION, SOLUTION EPIDURAL; INFILTRATION; INTRACAUDAL; PERINEURAL
Status: DISCONTINUED | OUTPATIENT
Start: 2023-11-27 | End: 2023-11-27 | Stop reason: HOSPADM

## 2023-11-27 RX ORDER — SODIUM CHLORIDE, SODIUM LACTATE, POTASSIUM CHLORIDE, CALCIUM CHLORIDE 600; 310; 30; 20 MG/100ML; MG/100ML; MG/100ML; MG/100ML
INJECTION, SOLUTION INTRAVENOUS CONTINUOUS
Status: DISCONTINUED | OUTPATIENT
Start: 2023-11-27 | End: 2023-11-27 | Stop reason: HOSPADM

## 2023-11-27 RX ORDER — FENTANYL CITRATE 50 UG/ML
INJECTION, SOLUTION INTRAMUSCULAR; INTRAVENOUS PRN
Status: DISCONTINUED | OUTPATIENT
Start: 2023-11-27 | End: 2023-11-27 | Stop reason: SDUPTHER

## 2023-11-27 RX ORDER — HYDROMORPHONE HYDROCHLORIDE 1 MG/ML
0.5 INJECTION, SOLUTION INTRAMUSCULAR; INTRAVENOUS; SUBCUTANEOUS EVERY 5 MIN PRN
Status: DISCONTINUED | OUTPATIENT
Start: 2023-11-27 | End: 2023-11-27 | Stop reason: HOSPADM

## 2023-11-27 RX ORDER — SODIUM CHLORIDE 0.9 % (FLUSH) 0.9 %
5-40 SYRINGE (ML) INJECTION EVERY 12 HOURS SCHEDULED
Status: DISCONTINUED | OUTPATIENT
Start: 2023-11-27 | End: 2023-11-27 | Stop reason: HOSPADM

## 2023-11-27 RX ORDER — EPHEDRINE SULFATE 50 MG/ML
INJECTION INTRAVENOUS PRN
Status: DISCONTINUED | OUTPATIENT
Start: 2023-11-27 | End: 2023-11-27 | Stop reason: SDUPTHER

## 2023-11-27 RX ORDER — MIDAZOLAM HYDROCHLORIDE 1 MG/ML
INJECTION INTRAMUSCULAR; INTRAVENOUS PRN
Status: DISCONTINUED | OUTPATIENT
Start: 2023-11-27 | End: 2023-11-27 | Stop reason: SDUPTHER

## 2023-11-27 RX ORDER — PROCHLORPERAZINE EDISYLATE 5 MG/ML
5 INJECTION INTRAMUSCULAR; INTRAVENOUS
Status: DISCONTINUED | OUTPATIENT
Start: 2023-11-27 | End: 2023-11-27 | Stop reason: HOSPADM

## 2023-11-27 RX ORDER — SODIUM CHLORIDE 9 MG/ML
INJECTION, SOLUTION INTRAVENOUS PRN
Status: DISCONTINUED | OUTPATIENT
Start: 2023-11-27 | End: 2023-11-27 | Stop reason: HOSPADM

## 2023-11-27 RX ORDER — LORAZEPAM 2 MG/ML
0.5 INJECTION INTRAMUSCULAR
Status: DISCONTINUED | OUTPATIENT
Start: 2023-11-27 | End: 2023-11-27 | Stop reason: HOSPADM

## 2023-11-27 RX ORDER — OXYCODONE HYDROCHLORIDE 5 MG/1
5 TABLET ORAL
Status: DISCONTINUED | OUTPATIENT
Start: 2023-11-27 | End: 2023-11-27 | Stop reason: HOSPADM

## 2023-11-27 RX ORDER — IPRATROPIUM BROMIDE AND ALBUTEROL SULFATE 2.5; .5 MG/3ML; MG/3ML
1 SOLUTION RESPIRATORY (INHALATION)
Status: DISCONTINUED | OUTPATIENT
Start: 2023-11-27 | End: 2023-11-27 | Stop reason: HOSPADM

## 2023-11-27 RX ORDER — ONDANSETRON 2 MG/ML
INJECTION INTRAMUSCULAR; INTRAVENOUS PRN
Status: DISCONTINUED | OUTPATIENT
Start: 2023-11-27 | End: 2023-11-27 | Stop reason: SDUPTHER

## 2023-11-27 RX ORDER — FENTANYL CITRATE 50 UG/ML
25 INJECTION, SOLUTION INTRAMUSCULAR; INTRAVENOUS EVERY 5 MIN PRN
Status: DISCONTINUED | OUTPATIENT
Start: 2023-11-27 | End: 2023-11-27 | Stop reason: HOSPADM

## 2023-11-27 RX ORDER — MIDAZOLAM HYDROCHLORIDE 2 MG/2ML
2 INJECTION, SOLUTION INTRAMUSCULAR; INTRAVENOUS
Status: DISCONTINUED | OUTPATIENT
Start: 2023-11-27 | End: 2023-11-27 | Stop reason: HOSPADM

## 2023-11-27 RX ORDER — PROPOFOL 10 MG/ML
INJECTION, EMULSION INTRAVENOUS PRN
Status: DISCONTINUED | OUTPATIENT
Start: 2023-11-27 | End: 2023-11-27 | Stop reason: SDUPTHER

## 2023-11-27 RX ORDER — ONDANSETRON 2 MG/ML
4 INJECTION INTRAMUSCULAR; INTRAVENOUS
Status: DISCONTINUED | OUTPATIENT
Start: 2023-11-27 | End: 2023-11-27 | Stop reason: HOSPADM

## 2023-11-27 RX ORDER — IBUPROFEN 800 MG/1
800 TABLET ORAL 3 TIMES DAILY PRN
Qty: 90 TABLET | Refills: 5 | Status: SHIPPED | OUTPATIENT
Start: 2023-11-27

## 2023-11-27 RX ORDER — ACETAMINOPHEN 500 MG
1000 TABLET ORAL ONCE
Status: DISCONTINUED | OUTPATIENT
Start: 2023-11-27 | End: 2023-11-27 | Stop reason: HOSPADM

## 2023-11-27 RX ADMIN — EPHEDRINE SULFATE 15 MG: 50 INJECTION INTRAVENOUS at 08:24

## 2023-11-27 RX ADMIN — ONDANSETRON 4 MG: 2 INJECTION INTRAMUSCULAR; INTRAVENOUS at 08:04

## 2023-11-27 RX ADMIN — DEXAMETHASONE SODIUM PHOSPHATE 4 MG: 4 INJECTION INTRA-ARTICULAR; INTRALESIONAL; INTRAMUSCULAR; INTRAVENOUS; SOFT TISSUE at 08:04

## 2023-11-27 RX ADMIN — FENTANYL CITRATE 25 MCG: 50 INJECTION, SOLUTION INTRAMUSCULAR; INTRAVENOUS at 08:33

## 2023-11-27 RX ADMIN — EPHEDRINE SULFATE 10 MG: 50 INJECTION INTRAVENOUS at 08:11

## 2023-11-27 RX ADMIN — FENTANYL CITRATE 50 MCG: 50 INJECTION, SOLUTION INTRAMUSCULAR; INTRAVENOUS at 08:09

## 2023-11-27 RX ADMIN — SODIUM CHLORIDE, POTASSIUM CHLORIDE, SODIUM LACTATE AND CALCIUM CHLORIDE: 600; 310; 30; 20 INJECTION, SOLUTION INTRAVENOUS at 08:34

## 2023-11-27 RX ADMIN — MIDAZOLAM 2 MG: 1 INJECTION INTRAMUSCULAR; INTRAVENOUS at 07:52

## 2023-11-27 RX ADMIN — EPHEDRINE SULFATE 15 MG: 50 INJECTION INTRAVENOUS at 08:14

## 2023-11-27 RX ADMIN — SODIUM CHLORIDE, POTASSIUM CHLORIDE, SODIUM LACTATE AND CALCIUM CHLORIDE: 600; 310; 30; 20 INJECTION, SOLUTION INTRAVENOUS at 07:49

## 2023-11-27 RX ADMIN — EPHEDRINE SULFATE 10 MG: 50 INJECTION INTRAVENOUS at 08:21

## 2023-11-27 RX ADMIN — FENTANYL CITRATE 25 MCG: 50 INJECTION, SOLUTION INTRAMUSCULAR; INTRAVENOUS at 08:06

## 2023-11-27 RX ADMIN — PROPOFOL 150 MG: 10 INJECTION, EMULSION INTRAVENOUS at 08:02

## 2023-11-27 RX ADMIN — LIDOCAINE HYDROCHLORIDE 100 MG: 20 INJECTION, SOLUTION EPIDURAL; INFILTRATION; INTRACAUDAL; PERINEURAL at 08:02

## 2023-11-27 ASSESSMENT — PAIN SCALES - GENERAL: PAINLEVEL_OUTOF10: 0

## 2023-11-27 ASSESSMENT — PAIN - FUNCTIONAL ASSESSMENT: PAIN_FUNCTIONAL_ASSESSMENT: 0-10

## 2023-11-27 NOTE — PROGRESS NOTES
Discharge instructions reviewed with patient and family using teachback. All questions have been answered. Prescriptions sent to [facility]. Vital signs stable, pain appropriately managed. Patient wheeled off the unit with PACU staff.

## 2023-11-27 NOTE — BRIEF OP NOTE
Brief Postoperative Note      Patient: Murray Mercedes  YOB: 1960  MRN: [id number]    Date of Procedure: 11/27/2023    Pre-Op Diagnosis Codes:     * Postmenopausal bleeding [N95.0]     * Thickened endometrium [R93.89]    Post-Op Diagnosis: Same       Procedure(s): HYSTEROSCOPY, DILATION AND CURETTAGE WITH MYOSURE AND ENDOMETRAIL BIOPSY    Surgeon(s):  MD Richard Newell MD    Assistant:  * No surgical staff found *    Anesthesia: General    Estimated Blood Loss (mL): Minimal    Complications: None    Specimens:   ID Type Source Tests Collected by Time Destination   1 : ENDOMETRIAL CURRETTINGS Tissue Tissue SURGICAL PATHOLOGY Shira Michaels MD 11/27/2023 0466        Implants:  * No implants in log *      Drains: * No LDAs found *    Findings: Irregular appearing atrophic endometrium, possibly slightly distorted from prior ablation/myomectomy procedure. No discrete polyps or abnormal appearing tissue, but bilateral tubal ostia unable to be visualized. [place] safe taking sample of tissue as uterus concurrently visualized with transabdominal ultrasound and instruments were felt to be in the correct place in endometrial cavity on bedside scan. Otherwise normal appearing cervical and vaginal tissue. Small fibroids demonstrated on US.       Electronically signed by Lourdes Mckeon MD on 11/27/2023 at 8:54 AM

## 2023-11-27 NOTE — DISCHARGE SUMMARY
Gynecology Discharge Summary     Patient ID:  Balwinder Morales  [id number]  58 y.o.  1960    Admit date: 11/27/2023    Discharge date and time: 11/27/23    Admission Diagnoses:    Patient Active Problem List   Diagnosis    CKD (chronic kidney disease)    HTN (hypertension)    S/P gynecological surgery, follow-up exam    Hypovitaminosis D    Hypercholesterolemia    Elevated hemoglobin A1c    ASCUS of cervix with negative high risk HPV     Discharge Diagnoses: There are no discharge diagnoses documented for the most recent discharge. Patient Active Problem List   Diagnosis    CKD (chronic kidney disease)    HTN (hypertension)    S/P gynecological surgery, follow-up exam    Hypovitaminosis D    Hypercholesterolemia    Elevated hemoglobin A1c    ASCUS of cervix with negative high risk HPV     Procedures for this admission: Procedure(s): HYSTEROSCOPY, DILATION AND CURETTAGE WITH [address] [place] [address] Course: Pt admitted for above procedure which was uncomplicated, stable for discharge home same day.     Disposition: Home or self care    Discharged Condition: stable            Patient Instructions:   Current Discharge Medication List        START taking these medications    Details   ibuprofen (ADVIL;MOTRIN) 800 MG tablet Take 1 tablet by mouth 3 times daily as needed for Pain  Qty: 90 tablet, Refills: 5           CONTINUE these medications which have NOT CHANGED    Details   sulindac (CLINORIL) 150 MG tablet Take 1 tablet by mouth as needed      amLODIPine (NORVASC) 5 MG tablet Take 1 tablet by mouth once daily  Qty: 90 tablet, Refills: 1    Comments: Needs office visit      atenolol (TENORMIN) 100 MG tablet Take 1 tablet by mouth once daily  Qty: 90 tablet, Refills: 1    Comments: Needs office visit      hydroCHLOROthiazide (HYDRODIURIL) 25 MG tablet Take 1 tablet by mouth once daily  Qty: 90 tablet, Refills: 0    Comments: Needs office visit      losartan (COZAAR) 100 MG tablet Take 1 tablet by

## 2023-11-27 NOTE — OP NOTE
Hysteroscopy, D&C, Myosure endometrial sampling:    -Preop diagnosis: PMB, cervical stenosis, thickened endometrium   -Postop diagnosis: same  -Procedure: hysteroscopy, dilation and curettage, myosure endometrial sampling  -Indication: 57 yo with chronic intermittent brown discharge and thickened endometrium on US with concurrent cystic areas within endometrium as well. Unable to adequately sample in office due to cervical stenosis. -Surgeon: Tayler Fraser MD  -Assistant: MD Dr. Nadeem Reddy, the assistant surgeon, was present during the procedure. The physician's presence was necessary due to cervical stenosis and fibroids and need for concurrent ultrasound with cervical dilation. The assistant surgeon performed significant portions of the surgery, including incising tissue, clamping, control of bleeding with suturing and cauterization, and decision making at challenging portions of the procedure. This assistance was necessary to accomplish the optimal outcome for the patient, above and beyond what a non-MD assistant could have provided.   -Anesthesia: general  -Complications: none  -EBL: scant <25cc  -IVF: 1L crystalloid  -UOP: voided prior to procedure  -Hysteroscopic fluid: normal saline  -Fluid deficit: 460cc  -Meds: none  -Specimen: endometrial curettings sent to pathology  -Findings: Irregular appearing atrophic endometrium, possibly slightly distorted from prior ablation/myomectomy procedure. No discrete polyps or abnormal appearing tissue, but bilateral tubal ostia unable to be visualized. [place] safe taking sample of tissue as uterus concurrently visualized with transabdominal ultrasound and instruments were felt to be in the correct position within endometrial cavity on bedside scan. Otherwise normal appearing cervical and vaginal tissue. Small fibroids demonstrated on US.     Description of procedure: Pt was taken to the operating room, was placed on the operating room table, and was given

## 2024-02-14 RX ORDER — ROSUVASTATIN CALCIUM 20 MG/1
TABLET, COATED ORAL
Qty: 90 TABLET | Refills: 0 | OUTPATIENT
Start: 2024-02-14

## 2024-02-14 NOTE — TELEPHONE ENCOUNTER
Pt needs an office visit and labs.     Last appointment: 02/17/2023 MD Wilcox   Next appointment: No show 07/10/2023     - Nothing rescheduled     For Pharmacy Admin Tracking Only    Program: Medication Refill  Intervention Detail: Discontinued Rx: 1, reason: Duplicate Therapy  Time Spent (min): 5    Requested Prescriptions     Pending Prescriptions Disp Refills    rosuvastatin (CRESTOR) 20 MG tablet [Pharmacy Med Name: Rosuvastatin Calcium 20 MG Oral Tablet] 90 tablet 0     Sig: TAKE 1 TABLET BY MOUTH NIGHTLY . APPOINTMENT REQUIRED FOR FUTURE REFILLS

## 2024-04-15 NOTE — TELEPHONE ENCOUNTER
Pt requested refill(s) via MYCHART     Last appointment: 02/17/2023 MD Wilcox   Next appointment: 05/15/2024 MD Wilcox   Previous refill encounter(s):   08/21/2023:  - Norvasc #90 with 1 refill,   - Tenormin #90 with 1 refill.     For Pharmacy Admin Tracking Only    Program: Medication Refill  Intervention Detail: New Rx: 2, reason: Patient Preference  Time Spent (min): 5    Requested Prescriptions     Pending Prescriptions Disp Refills    amLODIPine (NORVASC) 5 MG tablet 30 tablet 0     Sig: Take 1 tablet by mouth daily    atenolol (TENORMIN) 100 MG tablet 30 tablet 0     Sig: Take 1 tablet by mouth daily

## 2024-04-19 NOTE — TELEPHONE ENCOUNTER
Last appointment: 02/17/2023 MD Wilcox   Next appointment: 05/15/2024 MD Wilcox   Previous refill encounter(s):   08/21/2023:  - Crestor #90,   - Cozaar #90,   - HCTZ #90.     For Pharmacy Admin Tracking Only    Program: Medication Refill  Intervention Detail: New Rx: 3, reason: Patient Preference  Time Spent (min): 5    Requested Prescriptions     Pending Prescriptions Disp Refills    hydroCHLOROthiazide (HYDRODIURIL) 25 MG tablet [Pharmacy Med Name: hydroCHLOROthiazide 25 MG Oral Tablet] 90 tablet 0     Sig: Take 1 tablet by mouth daily    losartan (COZAAR) 100 MG tablet [Pharmacy Med Name: Losartan Potassium 100 MG Oral Tablet] 90 tablet 0     Sig: Take 1 tablet by mouth daily    rosuvastatin (CRESTOR) 20 MG tablet [Pharmacy Med Name: Rosuvastatin Calcium 20 MG Oral Tablet] 90 tablet 0     Sig: Take 1 tablet by mouth daily

## 2024-04-22 NOTE — TELEPHONE ENCOUNTER
04/25/2024 [id number] is following up on refill request via fax.   -------------------------------------------  Last appointment: 07/10/2023 MD Wilcox   Next appointment: 05/15/2024 MD Wilcox   Previous refill encounter(s):   08/21/2023 Crestor #90     For Pharmacy Admin Tracking Only    Program: Medication Refill  Intervention Detail: New Rx: 1, reason: Patient Preference  Time Spent (min): 5    Requested Prescriptions     Pending Prescriptions Disp Refills    rosuvastatin (CRESTOR) 20 MG tablet 90 tablet 0     Sig: Take 1 tablet by mouth nightly

## 2024-04-28 RX ORDER — LOSARTAN POTASSIUM 100 MG/1
100 TABLET ORAL DAILY
Qty: 30 TABLET | Refills: 0 | Status: SHIPPED | OUTPATIENT
Start: 2024-04-28

## 2024-04-28 RX ORDER — ROSUVASTATIN CALCIUM 20 MG/1
20 TABLET, COATED ORAL DAILY
Qty: 90 TABLET | Refills: 0 | OUTPATIENT
Start: 2024-04-28

## 2024-04-28 RX ORDER — HYDROCHLOROTHIAZIDE 25 MG/1
25 TABLET ORAL DAILY
Qty: 30 TABLET | Refills: 0 | Status: SHIPPED | OUTPATIENT
Start: 2024-04-28

## 2024-04-28 RX ORDER — ROSUVASTATIN CALCIUM 20 MG/1
20 TABLET, COATED ORAL NIGHTLY
Qty: 30 TABLET | Refills: 0 | Status: SHIPPED | OUTPATIENT
Start: 2024-04-28

## 2024-05-05 RX ORDER — AMLODIPINE BESYLATE 5 MG/1
5 TABLET ORAL DAILY
Qty: 30 TABLET | Refills: 0 | Status: SHIPPED | OUTPATIENT
Start: 2024-05-05

## 2024-05-05 RX ORDER — ATENOLOL 100 MG/1
100 TABLET ORAL DAILY
Qty: 30 TABLET | Refills: 0 | Status: SHIPPED | OUTPATIENT
Start: 2024-05-05

## 2024-05-09 ENCOUNTER — TRANSCRIBE ORDERS (OUTPATIENT)
Facility: HOSPITAL | Age: 64
End: 2024-05-09

## 2024-05-09 ENCOUNTER — HOSPITAL ENCOUNTER (OUTPATIENT)
Facility: HOSPITAL | Age: 64
Discharge: HOME | End: 2024-05-09
Payer: MEDICAID

## 2024-05-09 DIAGNOSIS — Z12.31 ENCOUNTER FOR SCREENING MAMMOGRAM FOR BREAST CANCER: Primary | ICD-10-CM

## 2024-05-09 DIAGNOSIS — Z12.31 ENCOUNTER FOR SCREENING MAMMOGRAM FOR BREAST CANCER: ICD-10-CM

## 2024-05-09 PROCEDURE — 77063 BREAST TOMOSYNTHESIS BI: CPT

## 2024-05-13 ENCOUNTER — HOSPITAL ENCOUNTER (OUTPATIENT)
Facility: HOSPITAL | Age: 64
Discharge: HOME | End: 2024-05-16
Attending: INTERNAL MEDICINE
Payer: COMMERCIAL

## 2024-05-13 VITALS — WEIGHT: 195 LBS | HEIGHT: 62 IN | BODY MASS INDEX: 35.88 KG/M2

## 2024-05-13 DIAGNOSIS — R92.8 ABNORMAL MAMMOGRAM: ICD-10-CM

## 2024-05-13 PROCEDURE — 77065 DX MAMMO INCL CAD UNI: CPT

## 2024-06-03 ENCOUNTER — HOSPITAL ENCOUNTER (OUTPATIENT)
Facility: HOSPITAL | Age: 64
Discharge: HOME | End: 2024-06-06
Payer: COMMERCIAL

## 2024-06-03 VITALS
RESPIRATION RATE: 18 BRPM | HEIGHT: 62 IN | WEIGHT: 195.8 LBS | SYSTOLIC BLOOD PRESSURE: 112 MMHG | DIASTOLIC BLOOD PRESSURE: 76 MMHG | TEMPERATURE: 97.5 F | HEART RATE: 55 BPM | BODY MASS INDEX: 36.03 KG/M2

## 2024-06-03 LAB
ABO + RH BLD: NORMAL
ANION GAP SERPL CALC-SCNC: 5 MMOL/L (ref 5–15)
APPEARANCE UR: CLEAR
BACTERIA URNS QL MICRO: ABNORMAL /HPF
BILIRUB UR QL: NEGATIVE
BLOOD GROUP ANTIBODIES SERPL: NORMAL
BUN SERPL-MCNC: 18 MG/DL (ref 6–20)
BUN/CREAT SERPL: 12 (ref 12–20)
CALCIUM SERPL-MCNC: 9.6 MG/DL (ref 8.5–10.1)
CHLORIDE SERPL-SCNC: 105 MMOL/L (ref 97–108)
CO2 SERPL-SCNC: 26 MMOL/L (ref 21–32)
COLOR UR: ABNORMAL
CREAT SERPL-MCNC: 1.48 MG/DL (ref 0.55–1.02)
EKG ATRIAL RATE: 55 BPM
EKG DIAGNOSIS: NORMAL
EKG P AXIS: 28 DEGREES
EKG P-R INTERVAL: 174 MS
EKG Q-T INTERVAL: 466 MS
EKG QRS DURATION: 86 MS
EKG QTC CALCULATION (BAZETT): 445 MS
EKG R AXIS: 0 DEGREES
EKG T AXIS: -4 DEGREES
EKG VENTRICULAR RATE: 55 BPM
EPITH CASTS URNS QL MICRO: ABNORMAL /LPF
ERYTHROCYTE [DISTWIDTH] IN BLOOD BY AUTOMATED COUNT: 12.9 % (ref 11.5–14.5)
EST. AVERAGE GLUCOSE BLD GHB EST-MCNC: 105 MG/DL
GLUCOSE SERPL-MCNC: 116 MG/DL (ref 65–100)
GLUCOSE UR STRIP.AUTO-MCNC: NEGATIVE MG/DL
HBA1C MFR BLD: 5.3 % (ref 4–5.6)
HCT VFR BLD AUTO: 40.8 % (ref 35–47)
HGB BLD-MCNC: 13.4 G/DL (ref 11.5–16)
HGB UR QL STRIP: NEGATIVE
HYALINE CASTS URNS QL MICRO: ABNORMAL /LPF (ref 0–5)
INR PPP: 1 (ref 0.9–1.1)
KETONES UR QL STRIP.AUTO: NEGATIVE MG/DL
LEUKOCYTE ESTERASE UR QL STRIP.AUTO: NEGATIVE
MCH RBC QN AUTO: 31.5 PG (ref 26–34)
MCHC RBC AUTO-ENTMCNC: 32.8 G/DL (ref 30–36.5)
MCV RBC AUTO: 95.8 FL (ref 80–99)
NITRITE UR QL STRIP.AUTO: NEGATIVE
NRBC # BLD: 0 K/UL (ref 0–0.01)
NRBC BLD-RTO: 0 PER 100 WBC
PH UR STRIP: 6.5 (ref 5–8)
PLATELET # BLD AUTO: 287 K/UL (ref 150–400)
PMV BLD AUTO: 9.6 FL (ref 8.9–12.9)
POTASSIUM SERPL-SCNC: 3.9 MMOL/L (ref 3.5–5.1)
PROT UR STRIP-MCNC: NEGATIVE MG/DL
PROTHROMBIN TIME: 10.2 SEC (ref 9–11.1)
RBC # BLD AUTO: 4.26 M/UL (ref 3.8–5.2)
RBC #/AREA URNS HPF: ABNORMAL /HPF (ref 0–5)
SODIUM SERPL-SCNC: 136 MMOL/L (ref 136–145)
SP GR UR REFRACTOMETRY: 1.02 (ref 1–1.03)
SPECIMEN EXP DATE BLD: NORMAL
URINE CULTURE IF INDICATED: ABNORMAL
UROBILINOGEN UR QL STRIP.AUTO: 1 EU/DL (ref 0.2–1)
WBC # BLD AUTO: 8.5 K/UL (ref 3.6–11)
WBC URNS QL MICRO: ABNORMAL /HPF (ref 0–4)

## 2024-06-03 PROCEDURE — 81001 URINALYSIS AUTO W/SCOPE: CPT

## 2024-06-03 PROCEDURE — 85027 COMPLETE CBC AUTOMATED: CPT

## 2024-06-03 PROCEDURE — 93005 ELECTROCARDIOGRAM TRACING: CPT | Performed by: ORTHOPAEDIC SURGERY

## 2024-06-03 PROCEDURE — 85610 PROTHROMBIN TIME: CPT

## 2024-06-03 PROCEDURE — 83036 HEMOGLOBIN GLYCOSYLATED A1C: CPT

## 2024-06-03 PROCEDURE — APPNB30 APP NON BILLABLE TIME 0-30 MINS: Performed by: NURSE PRACTITIONER

## 2024-06-03 PROCEDURE — 86901 BLOOD TYPING SEROLOGIC RH(D): CPT

## 2024-06-03 PROCEDURE — 80048 BASIC METABOLIC PNL TOTAL CA: CPT

## 2024-06-03 PROCEDURE — 86900 BLOOD TYPING SEROLOGIC ABO: CPT

## 2024-06-03 PROCEDURE — 86850 RBC ANTIBODY SCREEN: CPT

## 2024-06-03 PROCEDURE — 36415 COLL VENOUS BLD VENIPUNCTURE: CPT

## 2024-06-03 ASSESSMENT — KOOS JR
STRAIGHTENING KNEE FULLY: MILD
KOOS JR TOTAL INTERVAL SCORE: 65.994
STANDING UPRIGHT: MILD
GOING UP OR DOWN STAIRS: MILD
BENDING TO THE FLOOR TO PICK UP OBJECT: MODERATE
HOW SEVERE IS YOUR KNEE STIFFNESS AFTER FIRST WAKING IN MORNING: MILD
TWISING OR PIVOTING ON KNEE: MILD
RISING FROM SITTING: MILD

## 2024-06-03 ASSESSMENT — PROMIS GLOBAL HEALTH SCALE
SUM OF RESPONSES TO QUESTIONS 3, 6, 7, & 8: 12
IN GENERAL, HOW WOULD YOU RATE YOUR SATISFACTION WITH YOUR SOCIAL ACTIVITIES AND RELATIONSHIPS [ON A SCALE OF 1 (POOR) TO 5 (EXCELLENT)]?: VERY GOOD
SUM OF RESPONSES TO QUESTIONS 2, 4, 5, & 10: 16
IN THE PAST 7 DAYS, HOW WOULD YOU RATE YOUR PAIN ON AVERAGE [ON A SCALE FROM 0 (NO PAIN) TO 10 (WORST IMAGINABLE PAIN)]?: 3
IN GENERAL, HOW WOULD YOU RATE YOUR PHYSICAL HEALTH [ON A SCALE OF 1 (POOR) TO 5 (EXCELLENT)]?: GOOD
IN GENERAL, HOW WOULD YOU RATE YOUR MENTAL HEALTH, INCLUDING YOUR MOOD AND YOUR ABILITY TO THINK [ON A SCALE OF 1 (POOR) TO 5 (EXCELLENT)]?: VERY GOOD
WHO IS THE PERSON COMPLETING THE PROMIS V1.1 SURVEY?: SELF
IN GENERAL, WOULD YOU SAY YOUR QUALITY OF LIFE IS...[ON A SCALE OF 1 (POOR) TO 5 (EXCELLENT)]: GOOD
IN THE PAST 7 DAYS, HOW WOULD YOU RATE YOUR FATIGUE ON AVERAGE [ON A SCALE FROM 1 (NONE) TO 5 (VERY SEVERE)]?: MODERATE
TO WHAT EXTENT ARE YOU ABLE TO CARRY OUT YOUR EVERYDAY PHYSICAL ACTIVITIES SUCH AS WALKING, CLIMBING STAIRS, CARRYING GROCERIES, OR MOVING A CHAIR [ON A SCALE OF 1 (NOT AT ALL) TO 5 (COMPLETELY)]?: MODERATELY
HOW IS THE PROMIS V1.1 BEING ADMINISTERED?: PAPER
IN THE PAST 7 DAYS, HOW OFTEN HAVE YOU BEEN BOTHERED BY EMOTIONAL PROBLEMS, SUCH AS FEELING ANXIOUS, DEPRESSED, OR IRRITABLE [ON A SCALE FROM 1 (NEVER) TO 5 (ALWAYS)]?: NEVER
IN GENERAL, PLEASE RATE HOW WELL YOU CARRY OUT YOUR USUAL SOCIAL ACTIVITIES (INCLUDES ACTIVITIES AT HOME, AT WORK, AND IN YOUR COMMUNITY, AND RESPONSIBILITIES AS A PARENT, CHILD, SPOUSE, EMPLOYEE, FRIEND, ETC) [ON A SCALE OF 1 (POOR) TO 5 (EXCELLENT)]?: GOOD
IN GENERAL, WOULD YOU SAY YOUR HEALTH IS...[ON A SCALE OF 1 (POOR) TO 5 (EXCELLENT)]: VERY GOOD

## 2024-06-04 LAB
BACTERIA SPEC CULT: NORMAL
BACTERIA SPEC CULT: NORMAL
SERVICE CMNT-IMP: NORMAL

## 2024-06-04 NOTE — PERIOP NOTE
[address] [address]   MAIN OR                     [phone]    MAIN PRE OP             [phone]                                                                                AMBULATORY PRE OP          [phone]  PRE-ADMISSION TESTING    [phone]     Surgery Date:  6/17/24       Is surgery arrival time given by surgeon?  NO    If “NO”, [place] staff will call you between 4 and 7pm the day before your surgery with your arrival time. (If your surgery is on a Monday, we will call you the Friday before.)    Call [phone] after 7pm Monday-Friday if you did not receive this call.    INSTRUCTIONS BEFORE YOUR SURGERY   When You  Arrive Arrive at [facility] Patient Access on 1st floor the day of your surgery.  Have your insurance card, photo ID,living will/advanced directive/POA (if applicable),  and any copayment (if needed)   Food   and   Drink NO solid food after midnight the night before surgery. You can drink clear liquids from midnight until ONE hour prior to your arrival at the hospital on the day of your surgery. Clear liquids include:  Water  Fruit juices without pulp  Carbonated beverages  Black coffee(no cream/milk)  Tea(no cream/milk)  Gatorade    No alcohol (beer, wine, liquor) or marijuana (smoking) 24 hours, edibles (3 days). Stop smoking cigarettes 14 days before surgery (helps w/healing and breathing).   Medications to   TAKE   Morning of Surgery MEDICATIONS TO TAKE THE MORNING OF SURGERY WITH A SIP OF WATER:   ROSUVASTATIN, ATENOLOL, AMLODIPINE  You may take these medications, IF NEEDED, the morning of surgery: NONE  Ask your surgeon/prescribing doctor for instructions on taking or stopping these medications prior to surgery: NONE   Medications to STOP  before surgery Non-Steroidal anti-inflammatory Drugs (NSAID's): for example, Ibuprofen (Advil, Motrin), Naproxen (Aleve) IBUPROFIN, SULINDAC 3 days  STOP all herbal 
PROMIS AND KOOS COMPLETED, ENTERED INTO EPIC AND PLACED IN FOLDER AT .     PT REFUSED ADVANCED DIRECTIVE.    
Writer placed call to patient to offer appointment below per Justyna Hunter DO. Patient appreciative and accepted appointment. Patient scheduled for 1:50 on Friday 8/11. Patient expressed satisfaction and understanding to this plan with no additional questions or concerns at this time.     
  ____________________________________________  SOCIAL HISTORY  Social History     Tobacco Use    Smoking status: Never     Passive exposure: Never    Smokeless tobacco: Never   Substance Use Topics    Alcohol use: Not Currently     Comment: 2/month      ____________________________________________      Labs:     Hospital Outpatient Visit on 06/03/2024   Component Date Value Ref Range Status    Sodium 06/03/2024 136  136 - 145 mmol/L Final    Potassium 06/03/2024 3.9  3.5 - 5.1 mmol/L Final    Chloride 06/03/2024 105  97 - 108 mmol/L Final    CO2 06/03/2024 26  21 - 32 mmol/L Final    Anion Gap 06/03/2024 5  5 - 15 mmol/L Final    Glucose 06/03/2024 116 (H)  65 - 100 mg/dL Final    BUN 06/03/2024 18  6 - 20 MG/DL Final    Creatinine 06/03/2024 1.48 (H)  0.55 - 1.02 MG/DL Final    BUN/Creatinine Ratio 06/03/2024 12  12 - 20   Final    Est, Glom Filt Rate 06/03/2024 39 (L)  >60 ml/min/1.73m2 Final    Comment:    Pediatric calculator link: https://www.kidney.org/professionals/kdoqi/gfr_calculatorped     These results are not intended for use in patients <18 years of age.     eGFR results are calculated without a race factor using  the 2021 CKD-EPI equation. Careful clinical correlation is recommended, particularly when comparing to results calculated using previous equations.  The CKD-EPI equation is less accurate in patients with extremes of muscle mass, extra-renal metabolism of creatinine, excessive creatine ingestion, or following therapy that affects renal tubular secretion.      Calcium 06/03/2024 9.6  8.5 - 10.1 MG/DL Final    WBC 06/03/2024 8.5  3.6 - 11.0 K/uL Final    RBC 06/03/2024 4.26  3.80 - 5.20 M/uL Final    Hemoglobin 06/03/2024 13.4  11.5 - 16.0 g/dL Final    Hematocrit 06/03/2024 40.8  35.0 - 47.0 % Final    MCV 06/03/2024 95.8  80.0 - 99.0 FL Final    MCH 06/03/2024 31.5  26.0 - 34.0 PG Final    MCHC 06/03/2024 32.8  30.0 - 36.5 g/dL Final    RDW 06/03/2024 12.9  11.5 - 14.5 % Final    Platelets

## 2024-06-05 PROBLEM — Z01.818 ENCOUNTER FOR PREADMISSION TESTING: Status: ACTIVE | Noted: 2024-06-05

## (undated) DEVICE — BASIC SINGLE BASIN BTC-LF: Brand: MEDLINE INDUSTRIES, INC.

## (undated) DEVICE — GARMENT,MEDLINE,DVT,INT,CALF,MED, GEN2: Brand: MEDLINE

## (undated) DEVICE — GLOVE SURG SZ 65 L12IN FNGR THK94MIL STD WHT LTX FREE

## (undated) DEVICE — DRAPE,LITHOTOMY,STERILE: Brand: MEDLINE

## (undated) DEVICE — PAD,SANITARY,11 IN,MAXI,N-STRL,IND WRAP: Brand: MEDLINE

## (undated) DEVICE — SET ENDOSCP SEAL HYSTEROSCOPE RIG OUTFLO CHN DISP MYOSURE

## (undated) DEVICE — GOWN,SIRUS,NONRNF,SETINSLV,XL,20/CS: Brand: MEDLINE

## (undated) DEVICE — MARKER,SKIN,WI/RULER AND LABELS: Brand: MEDLINE

## (undated) DEVICE — FLUID MGMT SYS FLUENT KIT 6/PK

## (undated) DEVICE — COVER,TABLE,60X90,STERILE: Brand: MEDLINE

## (undated) DEVICE — SHEET,DRAPE,UNDERBUTTOCK,GRAD POUCH,PORT: Brand: MEDLINE

## (undated) DEVICE — PREMIUM WET SKIN PREP TRAY: Brand: MEDLINE INDUSTRIES, INC.

## (undated) DEVICE — SOLUTION IRRIG 3000ML 0.9% SOD CHL USP UROMATIC PLAS CONT

## (undated) DEVICE — SPONGE GZ W4XL4IN COT RADPQ HIGHLY ABSRB

## (undated) DEVICE — GLOVE SURG SZ 65 L12IN FNGR THK79MIL GRN LTX FREE

## (undated) DEVICE — TOWEL SURG W17XL27IN STD BLU COT NONFENESTRATED PREWASHED